# Patient Record
Sex: MALE | Race: WHITE | Employment: FULL TIME | URBAN - METROPOLITAN AREA
[De-identification: names, ages, dates, MRNs, and addresses within clinical notes are randomized per-mention and may not be internally consistent; named-entity substitution may affect disease eponyms.]

---

## 2018-02-21 ENCOUNTER — OFFICE VISIT (OUTPATIENT)
Dept: UROLOGY | Age: 63
End: 2018-02-21

## 2018-02-21 VITALS
WEIGHT: 215 LBS | HEIGHT: 70 IN | SYSTOLIC BLOOD PRESSURE: 114 MMHG | DIASTOLIC BLOOD PRESSURE: 81 MMHG | BODY MASS INDEX: 30.78 KG/M2 | TEMPERATURE: 97.7 F | OXYGEN SATURATION: 96 % | HEART RATE: 82 BPM

## 2018-02-21 DIAGNOSIS — R97.20 ELEVATED PSA: Primary | ICD-10-CM

## 2018-02-21 LAB
BILIRUB UR QL STRIP: NEGATIVE
GLUCOSE UR-MCNC: NORMAL MG/DL
KETONES P FAST UR STRIP-MCNC: NEGATIVE MG/DL
PH UR STRIP: 5 [PH] (ref 4.6–8)
PROT UR QL STRIP: NEGATIVE
SP GR UR STRIP: 1.01 (ref 1–1.03)
UA UROBILINOGEN AMB POC: NORMAL (ref 0.2–1)
URINALYSIS CLARITY POC: CLEAR
URINALYSIS COLOR POC: YELLOW
URINE BLOOD POC: NORMAL
URINE LEUKOCYTES POC: NEGATIVE
URINE NITRITES POC: NEGATIVE

## 2018-02-21 RX ORDER — SULFAMETHOXAZOLE AND TRIMETHOPRIM 800; 160 MG/1; MG/1
TABLET ORAL
Qty: 3 TAB | Refills: 0 | Status: SHIPPED | OUTPATIENT
Start: 2018-02-21 | End: 2018-04-05

## 2018-02-21 RX ORDER — CIPROFLOXACIN 500 MG/1
TABLET ORAL
Qty: 3 TAB | Refills: 0 | Status: SHIPPED | OUTPATIENT
Start: 2018-02-21 | End: 2018-04-05

## 2018-02-21 RX ORDER — METFORMIN HYDROCHLORIDE 500 MG/1
TABLET ORAL 2 TIMES DAILY WITH MEALS
COMMUNITY
Start: 2018-02-01

## 2018-02-21 RX ORDER — LISINOPRIL 2.5 MG/1
2.5 TABLET ORAL DAILY
COMMUNITY
Start: 2018-02-01

## 2018-02-21 NOTE — PROGRESS NOTES
MrMonica Hannah has a reminder for a \"due or due soon\" health maintenance. I have asked that he contact his primary care provider for follow-up on this health maintenance.

## 2018-02-21 NOTE — MR AVS SNAPSHOT
[address] [facility] [place] [address] [address] 
[phone] Patient: Raymond Queen MRN: [id number] Visit Information Date & Time Provider Department Dept. Phone Encounter #  
 2/21/2018  2:00 PM Krysten Lariosand [facility] [phone] Your Appointments 3/27/2018 10:15 AM  
BIOPSY with Elen Winchester MD  
[facility] Appt Note: prostate bx  
 [address] [place] [address] [address]  
[phone] [address] [facility] [address] Upcoming Health Maintenance Date Due Hepatitis C Screening 1955 DTaP/Tdap/Td series (1 - Tdap) 10/4/1975 FOBT Q 1 YEAR AGE 50-75 10/4/2004 ZOSTER VACCINE AGE 60> 8/4/2014 Influenza Age 5 to Adult 8/1/2017 Allergies as of 2/21/2018  Review Complete On: 2/21/2018 By: Rosy Peralta LPN No Known Allergies Current Immunizations  Never Reviewed No immunizations on file. Not reviewed this visit You Were Diagnosed With   
  
 Codes Comments Elevated PSA    -  Primary ICD-10-CM: R97.20 ICD-9-CM: 790.93 Vitals BP Pulse Temp Height(growth percentile) Weight(growth percentile) SpO2  
 114/81 (BP 1 Location: Left arm, BP Patient Position: Sitting) 82 97.7 °F (36.5 °C) 5' 10\" (1.778 m) 215 lb (97.5 kg) 96% BMI Smoking Status 30.85 kg/m2 Former Smoker BMI and BSA Data Body Mass Index Body Surface Area  
 30.85 kg/m 2 2.19 m 2 Preferred Pharmacy Pharmacy Name Phone [facility], [address] [place] [address] [phone] Your Updated Medication List  
  
   
This list is accurate as of 2/21/18  3:26 PM.  Always use your most recent med list.  
  
  
  
  
 lisinopril 2.5 mg tablet Commonly known as:  Adeola Luis  
  
 metFORMIN 500 mg tablet Commonly known as:  GLUCOPHAGE We Performed the Following AMB POC URINALYSIS DIP STICK AUTO W/O MICRO [12425 CPT(R)] Patient Instructions Prostate Cancer Screening: Care Instructions Your Care Instructions The prostate gland is an organ found just below a man's bladder. It is the size and shape of a walnut. It surrounds the tube that carries urine from the bladder out of the body through the penis. This tube is called the urethra. Prostate cancer is the abnormal growth of cells in the prostate. It is the second most common type of cancer in men. (Skin cancer is the most common.) Most cases of prostate cancer occur in men older than 72. The disease runs in families. And it's more common in -American men. When it's found and treated early, prostate cancer may be cured. But it is not always treated. This is because prostate cancer may not shorten your life, especially if you are older and the cancer is growing slowly. Follow-up care is a key part of your treatment and safety. Be sure to make and go to all appointments, and call your doctor if you are having problems. It's also a good idea to know your test results and keep a list of the medicines you take. What are the screening tests for prostate cancer? The main screening test for prostate cancer is the prostate-specific antigen (PSA) test. This is a blood test that measures how much PSA is in your blood. A high level may mean that you have an enlargement, an infection, or cancer. Along with the PSA test, you may have a digital rectal exam. The digital (finger) rectal exam checks for anything abnormal in your prostate. To do the exam, the doctor puts a lubricated, gloved finger into your rectum. If these tests suggest cancer, you may need a prostate biopsy. How is prostate cancer diagnosed?  
In a biopsy, the doctor takes small tissue samples from your prostate gland. Another doctor then looks at the tissue under a microscope to see if there are cancer cells, signs of infection, or other problems. The results help diagnose prostate cancer. What are the pros and cons of screening? Neither a PSA test nor a digital rectal exam can tell you for sure that you do or do not have cancer. But they can help you decide if you need more tests, such as a prostate biopsy. Screening tests may be useful because most men with prostate cancer don't have symptoms. It can be hard to know if you have cancer until it is more advanced. And then it's harder to treat. But having a PSA test can also cause harm. The test may show high levels of PSA that aren't caused by cancer. So you could have a prostate biopsy you didn't need. Or the PSA test might be normal when there is cancer, so a cancer might not be found early. The test can also find cancers that would never have caused a problem during your lifetime. So you might have treatment that was not needed. Prostate cancer usually develops late in life and grows slowly. For many men, it does not shorten their lives. Some experts advise screening only for men who are at high risk. Talk with your doctor to see if screening is right for you. Where can you learn more? Go to http://twila-jenny.info/. Enter R550 in the search box to learn more about \"Prostate Cancer Screening: Care Instructions. \" Current as of: May 12, 2017 Content Version: 11.4 © 9146-5904 [facility]. Care instructions adapted under license by [employer] (which disclaims liability or warranty for this information). If you have questions about a medical condition or this instruction, always ask your healthcare professional. [facility] [address] any warranty or liability for your use of this information. Introducing [facility]!    
 [employer] introduces [employer] patient portal. Now you can access parts of your medical record, email your doctor's office, and request medication refills online. 1. In your internet browser, go to [email or web address] 2. Click on the First Time User? Click Here link in the Sign In box. You will see the New Member Sign Up page. 3. Enter your LinguaNext Access Code exactly as it appears below. You will not need to use this code after youve completed the sign-up process. If you do not sign up before the expiration date, you must request a new code. · LinguaNext Access Code: [email or web address] Expires: 5/22/2018  1:29 PM 
 
4. Enter the last four digits of your Social Security Number (xxxx) and Date of Birth (mm/dd/yyyy) as indicated and click Submit. You will be taken to the next sign-up page. 5. Create a LinguaNext ID. This will be your LinguaNext login ID and cannot be changed, so think of one that is secure and easy to remember. 6. Create a LinguaNext password. You can change your password at any time. 7. Enter your Password Reset Question and Answer. This can be used at a later time if you forget your password. 8. Enter your e-mail address. You will receive e-mail notification when new information is available in [address] 9. Click Sign Up. You can now view and download portions of your medical record. 10. Click the Download Summary menu link to download a portable copy of your medical information. If you have questions, please visit the Frequently Asked Questions section of the LinguaNext website. Remember, LinguaNext is NOT to be used for urgent needs. For medical emergencies, dial 911. Now available from your iPhone and Android! Please provide this summary of care documentation to your next provider. Your primary care clinician is listed as Kolby Shirley. If you have any questions after today's visit, please call [phone].

## 2018-02-21 NOTE — PROGRESS NOTES
[address]    Chief Complaint   Patient presents with    New Patient    Elevated PSA    Nocturia       History and Physical    Patient is a pleasant 79-year-old male  who is sent for a one-time elevated PSA. The patient is uncertain about whether he has had PSA determinations done in the past.  This determination was done at a time when the patient was not having significant issues. The patient does, however, report that he has had trouble with urinary frequency but was found to have hyperglycemia and was begun on metformin. This has significantly improved his urination frequency at night but he does continue to have some daytime voiding frequency. There is no dysuria, no incontinence, no restriction of the stream.  The patient has not seen any hematuria. The patient denies any  injury surgery or instrumentation. There is no family history of benign or malignant prostate issues    History reviewed. No pertinent past medical history. There is no problem list on file for this patient. History reviewed. No pertinent surgical history. Current Outpatient Prescriptions   Medication Sig Dispense Refill    lisinopril (PRINIVIL, ZESTRIL) 2.5 mg tablet       metFORMIN (GLUCOPHAGE) 500 mg tablet        No Known Allergies  Social History     Social History    Marital status:      Spouse name: N/A    Number of children: N/A    Years of education: N/A     Occupational History    Not on file.      Social History Main Topics    Smoking status: Former Smoker     Quit date: 1/1/1981    Smokeless tobacco: Never Used    Alcohol use Yes    Drug use: No    Sexual activity: Yes     Other Topics Concern    Not on file     Social History Narrative    No narrative on file      Family History   Problem Relation Age of Onset    Cancer Mother     Breast Cancer Mother     Diabetes Brother     Diabetes Maternal Grandfather     Diabetes Paternal Grandfather              Visit Vitals    BP 114/81 (BP 1 Location: Left arm, BP Patient Position: Sitting)    Pulse 82    Temp 97.7 °F (36.5 °C)    Ht 5' 10\" (1.778 m)    Wt 215 lb (97.5 kg)    SpO2 96%    BMI 30.85 kg/m2     Physical        Gen: WDWN adult NAD  Head  : normocephalic,  Normal ROM; eyes without normal pupils, EOMs, no masses;  conjunctiva normal  Neck: normal movement,  no evident mass,  No evident adenopathy, trachea midline,    Abd :bowel sounds normal, no masses, tenderness, organomegaly  Flanks     -prostate is 40 g with a normal right lobe. The left lobe discloses a firm or hard area within the center occupying about 2 cm and midway between the base and the apex. It extends minimally to the lateral sulcus    Extremities- no edema, arthritis, deformity, swelling  Psych- oriented, no evident anxiety, no cognitive impairment evident    Urine is trace positive for blood but on microscopic I see less than 3 red blood cells                Impression/ PLAN  Borderline PSA elevation at 4.01  Irregular prostate exam    Plan:  Extensive discussion about prostate anatomy and potential for cancer development. I talked with him about the alternatives and advised that we proceed with transrectal ultrasound and proceed on with biopsy if necessary. The patient is aware of the risks that include, but are not limited to, infection, sepsis, bleeding, finding of cancer, failure to diagnose, and the possible need for additional procedures. This visit exceeded 30 minutes and >50% was counselling  The patient understands the discussion and plan    PLEASE NOTE:      This document has been produced using voice recognition software.   Unrecognized errors in transcription may be present    Elvi Corrales MD

## 2018-02-21 NOTE — PATIENT INSTRUCTIONS
Prostate Cancer Screening: Care Instructions  Your Care Instructions    The prostate gland is an organ found just below a man's bladder. It is the size and shape of a walnut. It surrounds the tube that carries urine from the bladder out of the body through the penis. This tube is called the urethra. Prostate cancer is the abnormal growth of cells in the prostate. It is the second most common type of cancer in men. (Skin cancer is the most common.)  Most cases of prostate cancer occur in men older than 72. The disease runs in families. And it's more common in -American men. When it's found and treated early, prostate cancer may be cured. But it is not always treated. This is because prostate cancer may not shorten your life, especially if you are older and the cancer is growing slowly. Follow-up care is a key part of your treatment and safety. Be sure to make and go to all appointments, and call your doctor if you are having problems. It's also a good idea to know your test results and keep a list of the medicines you take. What are the screening tests for prostate cancer? The main screening test for prostate cancer is the prostate-specific antigen (PSA) test. This is a blood test that measures how much PSA is in your blood. A high level may mean that you have an enlargement, an infection, or cancer. Along with the PSA test, you may have a digital rectal exam. The digital (finger) rectal exam checks for anything abnormal in your prostate. To do the exam, the doctor puts a lubricated, gloved finger into your rectum. If these tests suggest cancer, you may need a prostate biopsy. How is prostate cancer diagnosed? In a biopsy, the doctor takes small tissue samples from your prostate gland. Another doctor then looks at the tissue under a microscope to see if there are cancer cells, signs of infection, or other problems. The results help diagnose prostate cancer.   What are the pros and cons of screening? Neither a PSA test nor a digital rectal exam can tell you for sure that you do or do not have cancer. But they can help you decide if you need more tests, such as a prostate biopsy. Screening tests may be useful because most men with prostate cancer don't have symptoms. It can be hard to know if you have cancer until it is more advanced. And then it's harder to treat. But having a PSA test can also cause harm. The test may show high levels of PSA that aren't caused by cancer. So you could have a prostate biopsy you didn't need. Or the PSA test might be normal when there is cancer, so a cancer might not be found early. The test can also find cancers that would never have caused a problem during your lifetime. So you might have treatment that was not needed. Prostate cancer usually develops late in life and grows slowly. For many men, it does not shorten their lives. Some experts advise screening only for men who are at high risk. Talk with your doctor to see if screening is right for you. Where can you learn more? Go to http://twila-jenny.info/. Enter R550 in the search box to learn more about \"Prostate Cancer Screening: Care Instructions. \"  Current as of: May 12, 2017  Content Version: 11.4  © 1327-9061 [employer], [employer]. Care instructions adapted under license by [employer] (which disclaims liability or warranty for this information). If you have questions about a medical condition or this instruction, always ask your healthcare professional. [facility] any warranty or liability for your use of this information.

## 2018-03-27 ENCOUNTER — HOSPITAL ENCOUNTER (OUTPATIENT)
Dept: LAB | Age: 63
Discharge: HOME | End: 2018-03-27
Payer: COMMERCIAL

## 2018-03-27 ENCOUNTER — OFFICE VISIT (OUTPATIENT)
Dept: UROLOGY | Age: 63
End: 2018-03-27

## 2018-03-27 VITALS
TEMPERATURE: 98.1 F | WEIGHT: 215 LBS | SYSTOLIC BLOOD PRESSURE: 134 MMHG | HEART RATE: 62 BPM | BODY MASS INDEX: 30.78 KG/M2 | HEIGHT: 70 IN | OXYGEN SATURATION: 97 % | DIASTOLIC BLOOD PRESSURE: 87 MMHG

## 2018-03-27 DIAGNOSIS — R97.20 ELEVATED PSA: Primary | ICD-10-CM

## 2018-03-27 LAB
BILIRUB UR QL STRIP: NEGATIVE
GLUCOSE UR-MCNC: NEGATIVE MG/DL
KETONES P FAST UR STRIP-MCNC: NEGATIVE MG/DL
PH UR STRIP: 5.5 [PH] (ref 4.6–8)
PROT UR QL STRIP: NEGATIVE
SP GR UR STRIP: 1.02 (ref 1–1.03)
UA UROBILINOGEN AMB POC: NORMAL (ref 0.2–1)
URINALYSIS CLARITY POC: CLEAR
URINALYSIS COLOR POC: YELLOW
URINE BLOOD POC: NEGATIVE
URINE LEUKOCYTES POC: NEGATIVE
URINE NITRITES POC: NEGATIVE

## 2018-03-27 PROCEDURE — G0416 PROSTATE BIOPSY, ANY MTHD: HCPCS | Performed by: UROLOGY

## 2018-03-27 NOTE — PATIENT INSTRUCTIONS
Prostate Biopsy and Ultrasound: About This Test  What is it? A prostate biopsy is a type of test. Your doctor takes small tissue samples from your prostate gland. Then another doctor looks at the tissue under a microscope to see if there are cancer cells. This test is done by a doctor who specializes in men's genital and urinary problems (urologist). It can be done in your doctor's office, a day surgery clinic, or a hospital operating room. To get the tissue samples from the prostate, the doctor inserts a thin needle through the rectum, the urethra, or the area between the anus and scrotum (perineum). The most common method is through the rectum. Your doctor may use ultrasound to help guide the needle. Why is this test done? You may need a prostate biopsy if your doctor found something of concern during a digital rectal exam. Or you may need it if a blood test showed a high level of prostate-specific antigen (PSA). A biopsy can help find out if you have prostate cancer. How can you prepare for this test?  Before you have a prostate biopsy, tell your doctor if:  · You are taking any medicines or using any herbal supplements. · You are allergic to any medicines. · You have had bleeding problems, or you take aspirin or some other blood thinner. What happens before the test?  · You may need to have an enema before the test.  · You will need to take off all or most of your clothes. You will be given a cloth or paper gown to use during the test.  · You may be given a sedative through a vein (IV) in your arm. The sedative will help you relax and stay still. What happens during the test?  Through the rectum  · You may be asked to kneel, lie on your side, or lie on your back. · Your doctor may inject an anesthetic around the prostate to numb the area before the sample is taken. · Ultrasound is often used to guide the needle to the correct spot. · Your doctor may choose to use a needle guide for the biopsy. He or she will attach the guide to a finger. Your doctor will insert the finger into your rectum. · The needle will enter the prostate and take 6 to 12 samples. Through the urethra  · You will lie on your back. Your feet will rest in stirrups. · You will get anesthesia. The anesthesia may make you sleep. Or it may just numb the area being worked on.  · Your doctor will insert a lighted scope (cystoscope) into your urethra. The scope allows your doctor to look directly at the prostate. Your doctor will pass a cutting loop through the scope to remove samples of prostate tissue. Through the perineum  · You will lie on an exam table either on your side or on your back with your knees bent. You will get anesthesia. The anesthesia may make you sleep. Or it may just numb the area being worked on.  · Your doctor will make a small cut in your perineum. Then he or she will insert a finger into the rectum to hold the prostate. · Your doctor will then insert the needle through the cut and into the prostate. · The needle collects samples of tissue and is then pulled out. What else should you know about this test?  · A prostate biopsy has a slight risk of causing problems such as infection or bleeding. · If the biopsy went through your rectum, you may have a small amount of bleeding from your rectum for 2 to 3 days after the biopsy. · You may have a little pain in your pelvic area. You may also have a little blood in your urine for 1 to 5 days. · You may have some blood in your semen for a week or longer. How long will the test take? · This test will take about 15 to 45 minutes. What happens after the test?  Your doctor will tell you what to expect and watch for after your test. In general:  · If you have anesthesia that makes you sleep, you will be in a recovery room for a few hours. You will need someone to drive you home. You may feel tired for the rest of the day.   · You will probably be able to go home right away.  · If your doctor had you stop taking any medicine for the biopsy, ask him or her when you can start taking it again. If you were given antibiotics, take them as directed. · Do not do heavy work or exercise for 4 hours after the test.  · Your doctor will tell you how long it may take to get your results back. Follow-up care is a key part of your treatment and safety. Be sure to make and go to all appointments, and call your doctor if you are having problems. It's also a good idea to keep a list of the medicines you take. Ask your doctor when you can expect to have your test results. Where can you learn more? Go to http://twila-jenny.info/. Enter Y504 in the search box to learn more about \"Prostate Biopsy and Ultrasound: About This Test.\"  Current as of: May 12, 2017  Content Version: 11.4  © 4219-5457 Healthwise, [employer]. Care instructions adapted under license by [employer] (which disclaims liability or warranty for this information). If you have questions about a medical condition or this instruction, always ask your healthcare professional. [address] any warranty or liability for your use of this information.

## 2018-03-27 NOTE — MR AVS SNAPSHOT
[address] [place], [place] [address] [place] [address] 
[phone] Patient: Yogesh Joy MRN: [id number] Visit Information Date & Time Provider Department Dept. Phone Encounter #  
 3/27/2018 10:15 AM Risa Daniels[address] [facility] [phone] [id number] Upcoming Health Maintenance Date Due Hepatitis C Screening 1955 DTaP/Tdap/Td series (1 - Tdap) 10/4/1975 FOBT Q 1 YEAR AGE 50-75 10/4/2004 ZOSTER VACCINE AGE 60> 8/4/2014 Influenza Age 5 to Adult 8/1/2017 Allergies as of 3/27/2018  Review Complete On: 3/27/2018 By: Margarita Hernandez LPN No Known Allergies Current Immunizations  Never Reviewed No immunizations on file. Not reviewed this visit You Were Diagnosed With   
  
 Codes Comments Elevated PSA    -  Primary ICD-10-CM: R97.20 ICD-9-CM: 790.93 Vitals BP Pulse Temp Height(growth percentile) Weight(growth percentile) SpO2  
 134/87 (BP 1 Location: Left arm, BP Patient Position: Sitting) 62 98.1 °F (36.7 °C) 5' 10\" (1.778 m) 215 lb (97.5 kg) 97% BMI Smoking Status 30.85 kg/m2 Former Smoker Vitals History BMI and BSA Data Body Mass Index Body Surface Area  
 30.85 kg/m 2 2.19 m 2 [facility] [address] [address] [phone] Your Updated Medication List  
  
   
This list is accurate as of 3/27/18 10:40 AM.  Always use your most recent med list.  
  
  
  
  
 ciprofloxacin HCl 500 mg tablet Commonly known as:  CIPRO One tab po evening before biopsy; one tab po morning of biopsy; one tab po evening of biopsy  
  
 lisinopril 2.5 mg tablet Commonly known as:  PRINIVIL, ZESTRIL  
  
 metFORMIN 500 mg tablet Commonly known as:  GLUCOPHAGE  
  
 trimethoprim-sulfamethoxazole 160-800 mg per tablet Commonly known as:  BACTRIM DS, SEPTRA DS One tab po evening before biopsy; one tab po morning of biopsy; one tab po evening of biopsy We Performed the Following AMB POC URINALYSIS DIP STICK AUTO W/O MICRO [74134 CPT(R)] Patient Instructions Prostate Biopsy and Ultrasound: About This Test 
What is it? A prostate biopsy is a type of test. Your doctor takes small tissue samples from your prostate gland. Then another doctor looks at the tissue under a microscope to see if there are cancer cells. This test is done by a doctor who specializes in men's genital and urinary problems (urologist). It can be done in your doctor's office, a day surgery clinic, or a hospital operating room. To get the tissue samples from the prostate, the doctor inserts a thin needle through the rectum, the urethra, or the area between the anus and scrotum (perineum). The most common method is through the rectum. Your doctor may use ultrasound to help guide the needle. Why is this test done? You may need a prostate biopsy if your doctor found something of concern during a digital rectal exam. Or you may need it if a blood test showed a high level of prostate-specific antigen (PSA). A biopsy can help find out if you have prostate cancer. How can you prepare for this test? 
Before you have a prostate biopsy, tell your doctor if: 
· You are taking any medicines or using any herbal supplements. · You are allergic to any medicines. · You have had bleeding problems, or you take aspirin or some other blood thinner. What happens before the test? 
· You may need to have an enema before the test. 
· You will need to take off all or most of your clothes. You will be given a cloth or paper gown to use during the test. 
· You may be given a sedative through a vein (IV) in your arm. The sedative will help you relax and stay still.  
What happens during the test? 
 Through the rectum · You may be asked to kneel, lie on your side, or lie on your back. · Your doctor may inject an anesthetic around the prostate to numb the area before the sample is taken. · Ultrasound is often used to guide the needle to the correct spot. · Your doctor may choose to use a needle guide for the biopsy. He or she will attach the guide to a finger. Your doctor will insert the finger into your rectum. · The needle will enter the prostate and take 6 to 12 samples. Through the urethra · You will lie on your back. Your feet will rest in stirrups. · You will get anesthesia. The anesthesia may make you sleep. Or it may just numb the area being worked on. 
· Your doctor will insert a lighted scope (cystoscope) into your urethra. The scope allows your doctor to look directly at the prostate. Your doctor will pass a cutting loop through the scope to remove samples of prostate tissue. Through the perineum · You will lie on an exam table either on your side or on your back with your knees bent. You will get anesthesia. The anesthesia may make you sleep. Or it may just numb the area being worked on. 
· Your doctor will make a small cut in your perineum. Then he or she will insert a finger into the rectum to hold the prostate. · Your doctor will then insert the needle through the cut and into the prostate. · The needle collects samples of tissue and is then pulled out. What else should you know about this test? 
· A prostate biopsy has a slight risk of causing problems such as infection or bleeding. · If the biopsy went through your rectum, you may have a small amount of bleeding from your rectum for 2 to 3 days after the biopsy. · You may have a little pain in your pelvic area. You may also have a little blood in your urine for 1 to 5 days. · You may have some blood in your semen for a week or longer. How long will the test take? · This test will take about 15 to 45 minutes. What happens after the test? 
Your doctor will tell you what to expect and watch for after your test. In general: · If you have anesthesia that makes you sleep, you will be in a recovery room for a few hours. You will need someone to drive you home. You may feel tired for the rest of the day. · You will probably be able to go home right away. · If your doctor had you stop taking any medicine for the biopsy, ask him or her when you can start taking it again. If you were given antibiotics, take them as directed. · Do not do heavy work or exercise for 4 hours after the test. 
· Your doctor will tell you how long it may take to get your results back. Follow-up care is a key part of your treatment and safety. Be sure to make and go to all appointments, and call your doctor if you are having problems. It's also a good idea to keep a list of the medicines you take. Ask your doctor when you can expect to have your test results. Where can you learn more? Go to http://twila-jenny.info/. Enter Y504 in the search box to learn more about \"Prostate Biopsy and Ultrasound: About This Test.\" Current as of: May 12, 2017 Content Version: 11.4 © 1778-9442 Healthwise, mGaadi. Care instructions adapted under license by [employer] (which disclaims liability or warranty for this information). If you have questions about a medical condition or this instruction, always ask your healthcare professional. [place] [address] any warranty or liability for your use of this information. Introducing [facility]! Lolly Perkins introduces StaffInsight patient portal. Now you can access parts of your medical record, email your doctor's office, and request medication refills online. 1. In your internet browser, go to [email or web address] 2. Click on the First Time User? Click Here link in the Sign In box. You will see the New Member Sign Up page. 3. Enter your [email or web address].fm Access Code exactly as it appears below. You will not need to use this code after youve completed the sign-up process. If you do not sign up before the expiration date, you must request a new code. · [email or web address].fm Access Code: [email or web address] Expires: 5/22/2018  2:29 PM 
 
4. Enter the last four digits of your Social Security Number (xxxx) and Date of Birth (mm/dd/yyyy) as indicated and click Submit. You will be taken to the next sign-up page. 5. Create a OKDJ.fm ID. This will be your OKDJ.fm login ID and cannot be changed, so think of one that is secure and easy to remember. 6. Create a OKDJ.fm password. You can change your password at any time. 7. Enter your Password Reset Question and Answer. This can be used at a later time if you forget your password. 8. Enter your e-mail address. You will receive e-mail notification when new information is available in [address]. 9. Click Sign Up. You can now view and download portions of your medical record. 10. Click the Download Summary menu link to download a portable copy of your medical information. If you have questions, please visit the Frequently Asked Questions section of the OKDJ.fm website. Remember, OKDJ.fm is NOT to be used for urgent needs. For medical emergencies, dial 911. Now available from your iPhone and Android! Please provide this summary of care documentation to your next provider. Your primary care clinician is listed as Ashley Hansen. If you have any questions after today's visit, please call [phone].

## 2018-03-27 NOTE — PROGRESS NOTES
RBV Per Dr. Marcial Hyatt order placed for prostate biopsy performed today in office by Dr. Marcial Hyatt.

## 2018-03-27 NOTE — PROGRESS NOTES
INDICATIONS FOR STUDY: Elevated PSA    PATIENT IDENTIFIED PRIOR TO PROCEDURE    PSA: 4.1    TRANSRECTAL ULTRASOUND IMAGING OF PROSTATE        The patient is placed in the left lateral decubitus position and draped appropriately. A transrectal ultrasound guided is placed in the patient's rectum with multiple transverse and longitudinal images of the prostate seminal vesicles and bladder obtained. Prostate dimensions:  Height 4.79 Length 4.06 Width 3.0    Prostate volume: 35 g  Prostate appearance: There is an area of hypoechogenicity in the left lateral aspect as mentioned on digital rectal exam.  There is dense medial transition zone calcification equal on both sides causing acoustic shadowing    Biopsy procedure:   Prior to the biopsy procedure, the patient has been counseled about the risks which include, but are not limited to, infection; bleeding in urine, bowel movement,  Or ejaculation; failure to diagnose a cancer that is present; septic infection despite antibiotics; possible need for additional procedures, including biopsy, in the future. The patient is aware that a diagnosis of prostate cancer might be made at a later time. A spinal needle is introduced through the transrectal guide and quarter percent plain Marcaine is used to obtain a prostate block along the Denonvillier's  fascia and between seminal vesicles and prostate. After a suitable interval is permitted for establishment of anesthesia, a transurethral prostate biopsy needle is introduced and 6 cores of prostate tissue are obtained from the left side of the prostate and 6 cores of prostate tissue obtained from the right side of the prostate. The patient is then returned to the supine position and observed for a suitable period of time. The patient receives post biopsy instructions regarding activity, medication, and expectation of bleeding in his urine, bowel movement, and ejaculate.  The procedure was terminated, the patient having tolerated the procedure well.

## 2018-03-27 NOTE — PROGRESS NOTES
Mr. Kenisha Koehler has a reminder for a \"due or due soon\" health maintenance. I have asked that he contact his primary care provider for follow-up on this health maintenance. [facility]  OFFICE PROCEDURE PROGRESS NOTE        Chart reviewed for the following:   Jack JETER LPN, have reviewed the History, Physical and updated the Allergic reactions for [address] performed immediately prior to start of procedure:   Jack JETER LPN, have performed the following reviews on Jevon Michele prior to the start of the procedure:            * Patient was identified by name and date of birth   * Agreement on procedure being performed was verified  * Risks and Benefits explained to the patient  * Procedure site verified and marked as necessary  * Patient was positioned for comfort  * Consent was signed and verified     Time: 10:30am      Date of procedure: 3/27/2018    Procedure performed by:  Puma Snow MD    Provider assisted by: Candi Bean LPN    Patient assisted by: wife    How tolerated by patient: tolerated the procedure well with no complications    Post Procedural Pain Scale: 0 - No Hurt    Comments: Patient verbalized understanding of procedure and post procedure instructions. RBV Per Dr. Mukund Elam patient to have prostate tissue which was biopsied in office today sent out for pathology.

## 2018-04-05 ENCOUNTER — OFFICE VISIT (OUTPATIENT)
Dept: UROLOGY | Age: 63
End: 2018-04-05

## 2018-04-05 VITALS
HEIGHT: 70 IN | SYSTOLIC BLOOD PRESSURE: 114 MMHG | WEIGHT: 215 LBS | OXYGEN SATURATION: 94 % | BODY MASS INDEX: 30.78 KG/M2 | HEART RATE: 66 BPM | DIASTOLIC BLOOD PRESSURE: 74 MMHG

## 2018-04-05 DIAGNOSIS — C61 CANCER OF PROSTATE W/MED RECUR RISK (T2B-C OR GLEASON 7 OR PSA 10-20) (HCC): Primary | ICD-10-CM

## 2018-04-05 NOTE — PROGRESS NOTES
Chief Complaint   Patient presents with    Biopsy Results    Elevated PSA       HISTORY OF PRESENT ILLNESS:  Mitchell Singleton is a 60 y.o. male who presents today in follow-up to the examination and subsequent prostate biopsy by Dr. Irma Shirley a week ago. In summary, he presented here with a PSA of 4.01 and on Dr. Dahiana Villafana examination was noted to have an indurated left lobe of the prostate. The pathology report was returned as showing Kersey grade 6 and 7 in the entire left lobe. No cancer was mentioned in the right lobe. ROS documented on the chart    History reviewed. No pertinent past medical history. History reviewed. No pertinent surgical history. Social History   Substance Use Topics    Smoking status: Former Smoker     Quit date: 1/1/1981    Smokeless tobacco: Never Used    Alcohol use Yes       No Known Allergies    Family History   Problem Relation Age of Onset    Cancer Mother     Breast Cancer Mother     Diabetes Brother     Diabetes Maternal Grandfather     Diabetes Paternal Grandfather        Current Outpatient Prescriptions   Medication Sig Dispense Refill    metFORMIN (GLUCOPHAGE) 500 mg tablet       lisinopril (PRINIVIL, ZESTRIL) 2.5 mg tablet            PHYSICAL EXAMINATION:   Visit Vitals    /74 (BP 1 Location: Left arm, BP Patient Position: Sitting)    Pulse 66    Ht 5' 10\" (1.778 m)    Wt 215 lb (97.5 kg)    SpO2 94%    BMI 30.85 kg/m2     Constitutional: WDWN, Pleasant and appropriate affect, No acute distress. CV:  No peripheral swelling noted  Respiratory: No respiratory distress or difficulties  Abdomen:  No abdominal masses or tenderness. No CVA tenderness. No inguinal hernias noted.  Male:    Deferred today, refer to Dr. Dahiana Villafana note of a week ago. Skin: No jaundice. Neuro/Psych:  Alert and oriented x 3, affect appropriate. Lymphatic:   No enlarged inguinal lymph nodes.          Results for orders placed or performed in visit on 03/27/18   AMB POC URINALYSIS DIP STICK AUTO W/O MICRO   Result Value Ref Range    Color (UA POC) Yellow     Clarity (UA POC) Clear     Glucose (UA POC) Negative Negative    Bilirubin (UA POC) Negative Negative    Ketones (UA POC) Negative Negative    Specific gravity (UA POC) 1.025 1.001 - 1.035    Blood (UA POC) Negative Negative    pH (UA POC) 5.5 4.6 - 8.0    Protein (UA POC) Negative Negative    Urobilinogen (UA POC) 0.2 mg/dL 0.2 - 1    Nitrites (UA POC) Negative Negative    Leukocyte esterase (UA POC) Negative Negative         REVIEW OF LABS AND IMAGING:     Imaging Report Reviewed? NO     Images Reviewed? NO           Other Lab Data Reviewed? YES         ASSESSMENT:     ICD-10-CM ICD-9-CM    1. Cancer of prostate w/med recur risk (T2b-c or [place] 7 or PSA 10-20) ([facility] C61 [id number]             PLAN / DISCUSSION: : I discussed with him the findings of the biopsy report and he and I went over the findings the diagram the websites pertinent to prostate cancer and its staging and treatment options. (NCCN guidelines as well as Medscape staging guidelines). In  a 79-year-old otherwise healthy man with localized prostate cancer and a PSA of well less than 10, my first recommendation would be radical prostatectomy and my second choice would be radiation therapy. I have given him the websites literature and I would like for him to discuss this with his wife and family and then return in another week or 2 to talk with Dr. Irma Shirley about his feelings. The patient expresses understanding and agreement of the discussion and plan. Rodrigo Skaggs MD on 4/5/2018         Please note: This document has been produced using voice recognition software. Unrecognized errors in transcription may be present.

## 2018-04-05 NOTE — PATIENT INSTRUCTIONS
Prostate Cancer: Care Instructions  Your Care Instructions    The prostate gland is a small, walnut-shaped organ that lies just below a man's bladder. It surrounds the urethra, the tube that carries urine from the bladder out of the body through the penis. Prostate cancer is the abnormal growth of cells in the prostate gland. Prostate cancer cells can spread within the prostate, to nearby lymph nodes and other tissues, and to other parts of the body. When the cancer hasn't spread outside the prostate, it is called localized prostate cancer. With localized prostate cancer, your options depend on how likely it is that your cancer will grow. Tests will show if your cancer is likely to grow. · Low-risk cancer isn't likely to grow right away. If your cancer is low-risk, you can choose active surveillance. This means your cancer will be watched closely by your doctor with regular checkups and tests to see if the cancer grows. This choice allows you to delay having surgery or radiation, often for many years. If the cancer grows very slowly, you may never need treatment. · Medium-risk cancer is more likely to grow. Some men with this type of cancer may be able to choose active surveillance. Others may need to choose surgery or radiation. · High-risk cancer is most likely to grow. If you have high-risk cancer, you will likely need to choose surgery or radiation. If your cancer has already spread outside the prostate or to other parts of the body, then you may have other treatments, like chemotherapy or hormone therapy. If you are over age [de-identified] or have other serious health problems, like heart disease, you may choose not to have treatments to cure your cancer. Instead, you can just have treatments to manage your symptoms. This is called watchful waiting. Finding out that you have cancer is scary. You may feel many emotions and may need some help coping. Seek out family, friends, and counselors for support.  You also can do things at home to make yourself feel better while you go through treatment. Call the [facility] ([phone]) or visit its website at [address] [place] for more information. Follow-up care is a key part of your treatment and safety. Be sure to make and go to all appointments, and call your doctor if you are having problems. It's also a good idea to know your test results and keep a list of the medicines you take. How can you care for yourself at home? · Your doctor will talk to you about your treatment options. You may need to learn more about each of them before you can decide which treatment is best for you. · Take your medicines exactly as prescribed. Call your doctor if you think you are having a problem with your medicine. You will get more details on the specific medicines your doctor prescribes. · Eat healthy food. If you do not feel like eating, try to eat food that has protein and extra calories to keep up your strength and prevent weight loss. Drink liquid meal replacements for extra calories and protein. Try to eat your main meal early. · Take steps to control your stress and workload. Learn relaxation techniques. ¨ Share your feelings. Stress and tension affect our emotions. By expressing your feelings to others, you may be able to understand and cope with them. ¨ Consider joining a support group. Talking about a problem with your spouse, a good friend, or other people with similar problems is a good way to reduce tension and stress. ¨ Express yourself through art. Try writing, crafts, dance, or art to relieve stress. Some dance, writing, or art groups may be available just for people who have cancer. ¨ Be kind to your body and mind. Getting enough sleep, eating a healthy diet, and taking time to do things you enjoy can contribute to an overall feeling of balance in your life and can help reduce stress. ¨ Get help if you need it.  Discuss your concerns with your doctor or counselor. · Get some physical activity every day, but do not get too tired. Keep doing the hobbies you enjoy as your energy allows. · If you are vomiting or have diarrhea:  ¨ Drink plenty of fluids (enough so that your urine is light yellow or clear like water) to prevent dehydration. Choose water and other caffeine-free clear liquids. If you have kidney, heart, or liver disease and have to limit fluids, talk with your doctor before you increase the amount of fluids you drink. ¨ When you are able to eat, try clear soups, mild foods, and liquids until all symptoms are gone for 12 to 48 hours. Jell-O, dry toast, crackers, and cooked cereal are also good choices. · If you have not already done so, prepare a list of advance directives. Advance directives are instructions to your doctor and family members about what kind of care you want if you become unable to speak or express yourself. When should you call for help? Call 911 anytime you think you may need emergency care. For example, call if:  ? · You passed out (lost consciousness). ?Call your doctor now or seek immediate medical care if:  ? · You have new or worse pain. ? · You have new symptoms, such as a cough, belly pain, vomiting, diarrhea, or a rash. ? · You have symptoms of a urinary tract infection. For example:  ¨ You have blood or pus in your urine. ¨ You have pain in your back just below your rib cage. This is called flank pain. ¨ You have a fever, chills, or body aches. ¨ It hurts to urinate. ¨ You have groin or belly pain. ? Watch closely for changes in your health, and be sure to contact your doctor if:  ? · You have swollen glands in your armpits, groin, or neck. ? · You have trouble controlling your urine. ? · You do not get better as expected. Where can you learn more? Go to http://twila-jenny.info/. Enter V141 in the search box to learn more about \"Prostate Cancer: Care Instructions. \"  Current as of:  May 12, 2017  Content Version: 11.4  © 3730-3042 Healthwise, [employer]. Care instructions adapted under license by [employer] (which disclaims liability or warranty for this information). If you have questions about a medical condition or this instruction, always ask your healthcare professional. [address] any warranty or liability for your use of this information.

## 2018-04-05 NOTE — PROGRESS NOTES
Mr. Willie Lockett has a reminder for a \"due or due soon\" health maintenance. I have asked that he contact his primary care provider for follow-up on this health maintenance.

## 2018-04-05 NOTE — MR AVS SNAPSHOT
[address] [facility] [address] [facility] [address] 
[phone] Patient: Varsha Cash MRN: [id number] Visit Information Date & Time Provider Department Dept. Phone Encounter #  
 4/5/2018  3:00 PM Gino Lowry, Kryst[facility]  Upcoming Health Maintenance Date Due Hepatitis C Screening 1955 DTaP/Tdap/Td series (1 - Tdap) 10/4/1975 FOBT Q 1 YEAR AGE 50-75 10/4/2004 ZOSTER VACCINE AGE 60> 8/4/2014 Influenza Age 5 to Adult 8/1/2017 Allergies as of 4/5/2018  Review Complete On: 4/5/2018 By: Khushboo Harkins LPN No Known Allergies Current Immunizations  Never Reviewed No immunizations on file. Not reviewed this visit Vitals BP Pulse Height(growth percentile) Weight(growth percentile) SpO2 BMI  
 114/74 (BP 1 Location: Left arm, BP Patient Position: Sitting) 66 5' 10\" (1.778 m) 215 lb (97.5 kg) 94% 30.85 kg/m2 Smoking Status Former Smoker Vitals History BMI and BSA Data Body Mass Index Body Surface Area  
 30.85 kg/m 2 2.19 m 2 Preferred Pharmacy Pharmacy Name Phone [facility], [address] [phone] Your Updated Medication List  
  
   
This list is accurate as of 4/5/18  3:25 PM.  Always use your most recent med list.  
  
  
  
  
 lisinopril 2.5 mg tablet Commonly known as:  PRINIVIL, ZESTRIL  
  
 metFORMIN 500 mg tablet Commonly known as:  GLUCOPHAGE Patient Instructions Prostate Cancer: Care Instructions Your Care Instructions The prostate gland is a small, walnut-shaped organ that lies just below a man's bladder. It surrounds the urethra, the tube that carries urine from the bladder out of the body through the penis.  Prostate cancer is the abnormal growth of cells in the prostate gland. Prostate cancer cells can spread within the prostate, to nearby lymph nodes and other tissues, and to other parts of the body. When the cancer hasn't spread outside the prostate, it is called localized prostate cancer. With localized prostate cancer, your options depend on how likely it is that your cancer will grow. Tests will show if your cancer is likely to grow. · Low-risk cancer isn't likely to grow right away. If your cancer is low-risk, you can choose active surveillance. This means your cancer will be watched closely by your doctor with regular checkups and tests to see if the cancer grows. This choice allows you to delay having surgery or radiation, often for many years. If the cancer grows very slowly, you may never need treatment. · Medium-risk cancer is more likely to grow. Some men with this type of cancer may be able to choose active surveillance. Others may need to choose surgery or radiation. · High-risk cancer is most likely to grow. If you have high-risk cancer, you will likely need to choose surgery or radiation. If your cancer has already spread outside the prostate or to other parts of the body, then you may have other treatments, like chemotherapy or hormone therapy. If you are over age [de-identified] or have other serious health problems, like heart disease, you may choose not to have treatments to cure your cancer. Instead, you can just have treatments to manage your symptoms. This is called watchful waiting. Finding out that you have cancer is scary. You may feel many emotions and may need some help coping. Seek out family, friends, and counselors for support. You also can do things at home to make yourself feel better while you go through treatment. Call the [facility] (4-894.872.7342) or visit its website at [address]. org for more information. Follow-up care is a key part of your treatment and safety.  Be sure to make and go to all appointments, and call your doctor if you are having problems. It's also a good idea to know your test results and keep a list of the medicines you take. How can you care for yourself at home? · Your doctor will talk to you about your treatment options. You may need to learn more about each of them before you can decide which treatment is best for you. · Take your medicines exactly as prescribed. Call your doctor if you think you are having a problem with your medicine. You will get more details on the specific medicines your doctor prescribes. · Eat healthy food. If you do not feel like eating, try to eat food that has protein and extra calories to keep up your strength and prevent weight loss. Drink liquid meal replacements for extra calories and protein. Try to eat your main meal early. · Take steps to control your stress and workload. Learn relaxation techniques. ¨ Share your feelings. Stress and tension affect our emotions. By expressing your feelings to others, you may be able to understand and cope with them. ¨ Consider joining a support group. Talking about a problem with your spouse, a good friend, or other people with similar problems is a good way to reduce tension and stress. ¨ Express yourself through art. Try writing, crafts, dance, or art to relieve stress. Some dance, writing, or art groups may be available just for people who have cancer. ¨ Be kind to your body and mind. Getting enough sleep, eating a healthy diet, and taking time to do things you enjoy can contribute to an overall feeling of balance in your life and can help reduce stress. ¨ Get help if you need it. Discuss your concerns with your doctor or counselor. · Get some physical activity every day, but do not get too tired. Keep doing the hobbies you enjoy as your energy allows. · If you are vomiting or have diarrhea: ¨ Drink plenty of fluids (enough so that your urine is light yellow or clear like water) to prevent dehydration. Choose water and other caffeine-free clear liquids. If you have kidney, heart, or liver disease and have to limit fluids, talk with your doctor before you increase the amount of fluids you drink. ¨ When you are able to eat, try clear soups, mild foods, and liquids until all symptoms are gone for 12 to 48 hours. Jell-O, dry toast, crackers, and cooked cereal are also good choices. · If you have not already done so, prepare a list of advance directives. Advance directives are instructions to your doctor and family members about what kind of care you want if you become unable to speak or express yourself. When should you call for help? Call 911 anytime you think you may need emergency care. For example, call if: 
? · You passed out (lost consciousness). ?Call your doctor now or seek immediate medical care if: 
? · You have new or worse pain. ? · You have new symptoms, such as a cough, belly pain, vomiting, diarrhea, or a rash. ? · You have symptoms of a urinary tract infection. For example: ¨ You have blood or pus in your urine. ¨ You have pain in your back just below your rib cage. This is called flank pain. ¨ You have a fever, chills, or body aches. ¨ It hurts to urinate. ¨ You have groin or belly pain. ? Watch closely for changes in your health, and be sure to contact your doctor if: 
? · You have swollen glands in your armpits, groin, or neck. ? · You have trouble controlling your urine. ? · You do not get better as expected. Where can you learn more? Go to http://twila-jenny.info/. Enter V141 in the search box to learn more about \"Prostate Cancer: Care Instructions. \" Current as of: May 12, 2017 Content Version: 11.4 © 7990-2728 [employer].  Care instructions adapted under license by [employer] (which disclaims liability or warranty for this information). If you have questions about a medical condition or this instruction, always ask your healthcare professional. [address] any warranty or liability for your use of this information. Introducing [facility] & [facility] SERVICES! Paulette Silveira introduces [employer] patient portal. Now you can access parts of your medical record, email your doctor's office, and request medication refills online. 1. In your internet browser, go to [email or web address] 2. Click on the First Time User? Click Here link in the Sign In box. You will see the New Member Sign Up page. 3. Enter your [employer] Access Code exactly as it appears below. You will not need to use this code after youve completed the sign-up process. If you do not sign up before the expiration date, you must request a new code. · [employer] Access Code: [email or web address] Expires: 5/22/2018  2:29 PM 
 
4. Enter the last four digits of your Social Security Number (xxxx) and Date of Birth (mm/dd/yyyy) as indicated and click Submit. You will be taken to the next sign-up page. 5. Create a [employer] ID. This will be your [employer] login ID and cannot be changed, so think of one that is secure and easy to remember. 6. Create a [employer] password. You can change your password at any time. 7. Enter your Password Reset Question and Answer. This can be used at a later time if you forget your password. 8. Enter your e-mail address. You will receive e-mail notification when new information is available in [address] 9. Click Sign Up. You can now view and download portions of your medical record. 10. Click the Download Summary menu link to download a portable copy of your medical information. If you have questions, please visit the Frequently Asked Questions section of the [employer] website. Remember, [employer] is NOT to be used for urgent needs. For medical emergencies, dial 911. Now available from your iPhone and Android! Please provide this summary of care documentation to your next provider. Your primary care clinician is listed as Domingo Flowers. If you have any questions after today's visit, please call [phone].

## 2018-05-15 ENCOUNTER — OFFICE VISIT (OUTPATIENT)
Dept: UROLOGY | Age: 63
End: 2018-05-15

## 2018-05-15 VITALS
HEART RATE: 61 BPM | BODY MASS INDEX: 30.78 KG/M2 | DIASTOLIC BLOOD PRESSURE: 75 MMHG | WEIGHT: 215 LBS | SYSTOLIC BLOOD PRESSURE: 122 MMHG | OXYGEN SATURATION: 97 % | HEIGHT: 70 IN

## 2018-05-15 DIAGNOSIS — C61 CANCER OF PROSTATE (HCC): Primary | ICD-10-CM

## 2018-05-15 NOTE — MR AVS SNAPSHOT
[address] [place] [address] 
[phone] Patient: Nicholas Pham MRN: [id number] Visit Information Date & Time Provider Department Dept. Phone Encounter #  
 5/15/2018  1:45 PM Joo Lagos, [address] [place] [address] [facility] [phone] Upcoming Health Maintenance Date Due Hepatitis C Screening 1955 Pneumococcal 19-64 Highest Risk (1 of 3 - PCV13) 10/4/1973 DTaP/Tdap/Td series (1 - Tdap) 10/4/1975 FOBT Q 1 YEAR AGE 50-75 10/4/2004 ZOSTER VACCINE AGE 60> 8/4/2014 Influenza Age 5 to Adult 8/1/2018 Allergies as of 5/15/2018  Review Complete On: 5/15/2018 By: Benjaman Kocher, LPN No Known Allergies Current Immunizations  Never Reviewed No immunizations on file. Not reviewed this visit Vitals BP Pulse Height(growth percentile) Weight(growth percentile) SpO2 BMI  
 122/75 (BP 1 Location: Right arm, BP Patient Position: Sitting) 61 5' 10\" (1.778 m) 215 lb (97.5 kg) 97% 30.85 kg/m2 Smoking Status Former Smoker Vitals History BMI and BSA Data Body Mass Index Body Surface Area  
 30.85 kg/m 2 2.19 m 2 Preferred Pharmacy Pharmacy Name Phone [facility], [address] [phone] Your Updated Medication List  
  
   
This list is accurate as of 5/15/18  2:37 PM.  Always use your most recent med list.  
  
  
  
  
 lisinopril 2.5 mg tablet Commonly known as:  PRINIVIL, ZESTRIL  
  
 metFORMIN 500 mg tablet Commonly known as:  GLUCOPHAGE Patient Instructions Deciding Between Radiation and Surgery for Localized Prostate Cancer Deciding Between Radiation and Surgery for Localized Prostate Cancer What is localized prostate cancer? Prostate cancer is the abnormal growth of cells in the prostate gland. Cancer that has not spread outside the prostate gland is called localized prostate cancer. Men with localized prostate cancer have options for their care. Some men with a slowly growing cancer choose active surveillance. This means that their doctors will watch them closely with regular checkups and tests. If their cancer grows, then they can decide about treatment. And some men choose watchful waiting. This means they don't want any treatment that tries to cure their cancer. Other men choose to treat their cancer right away. They can choose to have radiation to destroy the cancer cells in the prostate. Or they can choose to have surgery to remove the prostate (radical prostatectomy). Tests show if a localized prostate cancer is likely to grow. · Low-risk means that the cancer isn't likely to grow right away. There is a chance that it may grow so slowly that it never causes any problems. · Medium-risk means that the cancer is more likely to grow. Most men will likely need treatment with radiation or surgery. · High-risk means that the cancer will most likely grow right away. Men will likely need treatment with radiation or surgery. Some men may have limited treatment choices because of their health. For example, a man who has serious health concerns may not be well enough to have surgery. Or a man who has a serious bowel disease such as ulcerative colitis may not be able to have radiation. Your doctor will help you know if you have options. This information is about choosing between radiation or surgery. What are the key points about this decision? · Radiation or surgery may be used to treat your prostate cancer. Both treatments work well. With either treatment, the chance of your cancer spreading is low.  
· Both treatments can have side effects, such as bladder, bowel, and erection problems. Radiation is more likely to cause bowel problems. Surgery is more likely to cause leaking urine or erection problems. · How you feel about each treatment may affect your choice. For example, you may choose surgery because you want the cancer removed through an operation. Or you may choose radiation because you want to avoid major surgery and its risks. Why might you choose surgery? If your goal is to treat the cancer by having your prostate removed, then you may want to choose surgery. For some men, the idea of \"getting the cancer out\" brings a sense of relief. For other men, avoiding radiation may be what is important to them. Why might you choose radiation? If your goal is to treat the cancer and avoid the risks of major surgery, then you may want to choose radiation. For some men, preserving their sexual function for as long as possible is what they value most. Having radiation rather than surgery may help avoid erection problems. Your decision To make the decision that is best for you, take the time to understand your options. Sometimes the medical facts will guide your decision. But a good decision also reflects what matters most to you. This includes how you feel about radiation or surgery and the likely side effects. · Find out about the treatments. What would your radiation treatment plan be like? Or if you choose surgery, how would your surgery be done? · Find out about the side effects. How important to you is being sexually active and being able to maintain an erection? If leaking urine is a side effect of your surgery, how much would that bother you? Think about what you value. Then talk with your doctor. Where can you learn more? Go to http://twila-jenny.info/. Enter A559 in the search box to learn more about \"Deciding Between Radiation and Surgery for Localized Prostate Cancer. \" Current as of: May 12, 2017 Content Version: 11.4 © 3686-8284 [employer], [employer]. Care instructions adapted under license by [employer] (which disclaims liability or warranty for this information). If you have questions about a medical condition or this instruction, always ask your healthcare professional. [address] any warranty or liability for your use of this information. Introducing [facility] & HEALTH SERVICES! [facility] introduces [employer] patient portal. Now you can access parts of your medical record, email your doctor's office, and request medication refills online. 1. In your internet browser, go to [email or web address] 2. Click on the First Time User? Click Here link in the Sign In box. You will see the New Member Sign Up page. 3. Enter your [employer] Access Code exactly as it appears below. You will not need to use this code after youve completed the sign-up process. If you do not sign up before the expiration date, you must request a new code. · [employer] Access Code: [email or web address] Expires: 5/22/2018  2:29 PM 
 
4. Enter the last four digits of your Social Security Number (xxxx) and Date of Birth (mm/dd/yyyy) as indicated and click Submit. You will be taken to the next sign-up page. 5. Create a [employer] ID. This will be your [employer] login ID and cannot be changed, so think of one that is secure and easy to remember. 6. Create a [employer] password. You can change your password at any time. 7. Enter your Password Reset Question and Answer. This can be used at a later time if you forget your password. 8. Enter your e-mail address. You will receive e-mail notification when new information is available in [address] 9. Click Sign Up. You can now view and download portions of your medical record. 10. Click the Download Summary menu link to download a portable copy of your medical information.  
 
If you have questions, please visit the Frequently Asked Questions section of the [employer]. Remember, "[employer]hart is NOT to be used for urgent needs. For medical emergencies, dial 911. Now available from your iPhone and Android! Please provide this summary of care documentation to your next provider. Your primary care clinician is listed as Domingo Flowers. If you have any questions after today's visit, please call [phone].

## 2018-05-15 NOTE — PROGRESS NOTES
The patient returns with his wife for discussion of his cancer. The patient has a left-sided stage T2b prostate cancer recent score 7, 3+4, with maximum PSA of 4.01. Maximum core involvement is 37%. We have had extended discussion regarding the categories of treatment including surveillance versus radiation versus surgery versus alternatives including proton beam.  I have advised the patient that I believe that the radiation therapy either in the form of external beam or radioactive seeds or a combination thereof is an alternative. Another equally valid alternative is radical prostatectomy. Pros and cons are discussed. I have advised that I would begin by sending the patient to a urologist with skill in the robotic approach for further discussion to be followed thereafter by consideration of radiation oncology consultation if the patient so desires her graph literature is provided    This visit exceeded 40 minutes and greater than 50% was counseling. The patient expresses understanding of the treatment plan and wishes to proceed    This dictation used voice recognition software and there may be mistakes.     Jeremy Garcia MD

## 2018-05-15 NOTE — PATIENT INSTRUCTIONS
Deciding Between Radiation and Surgery for Localized Prostate Cancer  Deciding Between Radiation and Surgery for Localized Prostate Cancer    What is localized prostate cancer? Prostate cancer is the abnormal growth of cells in the prostate gland. Cancer that has not spread outside the prostate gland is called localized prostate cancer. Men with localized prostate cancer have options for their care. Some men with a slowly growing cancer choose active surveillance. This means that their doctors will watch them closely with regular checkups and tests. If their cancer grows, then they can decide about treatment. And some men choose watchful waiting. This means they don't want any treatment that tries to cure their cancer. Other men choose to treat their cancer right away. They can choose to have radiation to destroy the cancer cells in the prostate. Or they can choose to have surgery to remove the prostate (radical prostatectomy). Tests show if a localized prostate cancer is likely to grow. · Low-risk means that the cancer isn't likely to grow right away. There is a chance that it may grow so slowly that it never causes any problems. · Medium-risk means that the cancer is more likely to grow. Most men will likely need treatment with radiation or surgery. · High-risk means that the cancer will most likely grow right away. Men will likely need treatment with radiation or surgery. Some men may have limited treatment choices because of their health. For example, a man who has serious health concerns may not be well enough to have surgery. Or a man who has a serious bowel disease such as ulcerative colitis may not be able to have radiation. Your doctor will help you know if you have options. This information is about choosing between radiation or surgery. What are the key points about this decision? · Radiation or surgery may be used to treat your prostate cancer. Both treatments work well.  With either treatment, the chance of your cancer spreading is low. · Both treatments can have side effects, such as bladder, bowel, and erection problems. Radiation is more likely to cause bowel problems. Surgery is more likely to cause leaking urine or erection problems. · How you feel about each treatment may affect your choice. For example, you may choose surgery because you want the cancer removed through an operation. Or you may choose radiation because you want to avoid major surgery and its risks. Why might you choose surgery? If your goal is to treat the cancer by having your prostate removed, then you may want to choose surgery. For some men, the idea of \"getting the cancer out\" brings a sense of relief. For other men, avoiding radiation may be what is important to them. Why might you choose radiation? If your goal is to treat the cancer and avoid the risks of major surgery, then you may want to choose radiation. For some men, preserving their sexual function for as long as possible is what they value most. Having radiation rather than surgery may help avoid erection problems. Your decision  To make the decision that is best for you, take the time to understand your options. Sometimes the medical facts will guide your decision. But a good decision also reflects what matters most to you. This includes how you feel about radiation or surgery and the likely side effects. · Find out about the treatments. What would your radiation treatment plan be like? Or if you choose surgery, how would your surgery be done? · Find out about the side effects. How important to you is being sexually active and being able to maintain an erection? If leaking urine is a side effect of your surgery, how much would that bother you? Think about what you value. Then talk with your doctor. Where can you learn more? Go to http://twila-jenny.info/.   Enter F828 in the search box to learn more about \"Deciding Between Radiation and Surgery for Localized Prostate Cancer. \"  Current as of: May 12, 2017  Content Version: 11.4  © 2055-1151 Healthwise, Incorporated. Care instructions adapted under license by [employer] (which disclaims liability or warranty for this information). If you have questions about a medical condition or this instruction, always ask your healthcare professional. [place] [address] any warranty or liability for your use of this information.

## 2018-05-15 NOTE — PROGRESS NOTES
Mr. Ardean Crigler has a reminder for a \"due or due soon\" health maintenance. I have asked that he contact his primary care provider for follow-up on this health maintenance.

## 2018-06-26 ENCOUNTER — HOSPITAL ENCOUNTER (OUTPATIENT)
Dept: PREADMISSION TESTING | Age: 63
Discharge: HOME | End: 2018-06-26
Payer: COMMERCIAL

## 2018-06-26 DIAGNOSIS — C61 PROSTATE CANCER (HCC): ICD-10-CM

## 2018-06-26 LAB
ABO + RH BLD: NORMAL
ANION GAP SERPL CALC-SCNC: 7 MMOL/L (ref 3–18)
APTT PPP: 30.3 SEC (ref 23–36.4)
ATRIAL RATE: 60 BPM
BLOOD GROUP ANTIBODIES SERPL: NORMAL
BUN SERPL-MCNC: 15 MG/DL (ref 7–18)
BUN/CREAT SERPL: 13 (ref 12–20)
CALCIUM SERPL-MCNC: 9 MG/DL (ref 8.5–10.1)
CALCULATED P AXIS, ECG09: 44 DEGREES
CALCULATED R AXIS, ECG10: -17 DEGREES
CALCULATED T AXIS, ECG11: 7 DEGREES
CHLORIDE SERPL-SCNC: 107 MMOL/L (ref 100–108)
CO2 SERPL-SCNC: 29 MMOL/L (ref 21–32)
CREAT SERPL-MCNC: 1.17 MG/DL (ref 0.6–1.3)
DIAGNOSIS, 93000: NORMAL
ERYTHROCYTE [DISTWIDTH] IN BLOOD BY AUTOMATED COUNT: 12.7 % (ref 11.6–14.5)
GLUCOSE SERPL-MCNC: 127 MG/DL (ref 74–99)
HCT VFR BLD AUTO: 46.2 % (ref 36–48)
HGB BLD-MCNC: 15.8 G/DL (ref 13–16)
INR PPP: 1 (ref 0.8–1.2)
MCH RBC QN AUTO: 29.2 PG (ref 24–34)
MCHC RBC AUTO-ENTMCNC: 34.2 G/DL (ref 31–37)
MCV RBC AUTO: 85.2 FL (ref 74–97)
P-R INTERVAL, ECG05: 156 MS
PLATELET # BLD AUTO: 184 K/UL (ref 135–420)
PMV BLD AUTO: 11.6 FL (ref 9.2–11.8)
POTASSIUM SERPL-SCNC: 4 MMOL/L (ref 3.5–5.5)
PROTHROMBIN TIME: 13.1 SEC (ref 11.5–15.2)
Q-T INTERVAL, ECG07: 400 MS
QRS DURATION, ECG06: 112 MS
QTC CALCULATION (BEZET), ECG08: 400 MS
RBC # BLD AUTO: 5.42 M/UL (ref 4.7–5.5)
SODIUM SERPL-SCNC: 143 MMOL/L (ref 136–145)
SPECIMEN EXP DATE BLD: NORMAL
VENTRICULAR RATE, ECG03: 60 BPM
WBC # BLD AUTO: 10.9 K/UL (ref 4.6–13.2)

## 2018-06-26 PROCEDURE — 85027 COMPLETE CBC AUTOMATED: CPT | Performed by: UROLOGY

## 2018-06-26 PROCEDURE — 93005 ELECTROCARDIOGRAM TRACING: CPT

## 2018-06-26 PROCEDURE — 86900 BLOOD TYPING SEROLOGIC ABO: CPT | Performed by: UROLOGY

## 2018-06-26 PROCEDURE — 80048 BASIC METABOLIC PNL TOTAL CA: CPT | Performed by: UROLOGY

## 2018-06-26 PROCEDURE — 85730 THROMBOPLASTIN TIME PARTIAL: CPT | Performed by: UROLOGY

## 2018-06-26 PROCEDURE — 85610 PROTHROMBIN TIME: CPT | Performed by: UROLOGY

## 2018-06-26 PROCEDURE — 36415 COLL VENOUS BLD VENIPUNCTURE: CPT | Performed by: UROLOGY

## 2018-06-26 RX ORDER — CEFAZOLIN SODIUM 2 G/50ML
2 SOLUTION INTRAVENOUS ONCE
Status: CANCELLED | OUTPATIENT
Start: 2018-06-28 | End: 2018-06-28

## 2018-06-26 RX ORDER — HEPARIN SODIUM 5000 [USP'U]/ML
5000 INJECTION, SOLUTION INTRAVENOUS; SUBCUTANEOUS ONCE
Status: CANCELLED | OUTPATIENT
Start: 2018-06-28 | End: 2018-06-28

## 2018-06-27 ENCOUNTER — ANESTHESIA EVENT (OUTPATIENT)
Dept: SURGERY | Age: 63
End: 2018-06-27
Payer: COMMERCIAL

## 2018-06-28 ENCOUNTER — HOSPITAL ENCOUNTER (OUTPATIENT)
Age: 63
Setting detail: OBSERVATION
Discharge: HOME | End: 2018-06-29
Attending: UROLOGY | Admitting: UROLOGY
Payer: COMMERCIAL

## 2018-06-28 ENCOUNTER — ANESTHESIA (OUTPATIENT)
Dept: SURGERY | Age: 63
End: 2018-06-28
Payer: COMMERCIAL

## 2018-06-28 PROBLEM — C61 PROSTATE CANCER (HCC): Status: ACTIVE | Noted: 2018-06-28

## 2018-06-28 LAB
EST. AVERAGE GLUCOSE BLD GHB EST-MCNC: 131 MG/DL
GLUCOSE BLD STRIP.AUTO-MCNC: 103 MG/DL (ref 70–110)
GLUCOSE BLD STRIP.AUTO-MCNC: 152 MG/DL (ref 70–110)
GLUCOSE BLD STRIP.AUTO-MCNC: 202 MG/DL (ref 70–110)
HBA1C MFR BLD: 6.2 % (ref 4.2–5.6)

## 2018-06-28 PROCEDURE — 99218 HC RM OBSERVATION: CPT

## 2018-06-28 PROCEDURE — 77030010939 HC CLP LIG TELE -B: Performed by: UROLOGY

## 2018-06-28 PROCEDURE — 74011250636 HC RX REV CODE- 250/636: Performed by: UROLOGY

## 2018-06-28 PROCEDURE — 77030010517 HC APPL SEAL FLOSEL BAXT -B: Performed by: UROLOGY

## 2018-06-28 PROCEDURE — 77030039266 HC ADH SKN EXOFIN S2SG -A: Performed by: UROLOGY

## 2018-06-28 PROCEDURE — 74011000250 HC RX REV CODE- 250

## 2018-06-28 PROCEDURE — 88307 TISSUE EXAM BY PATHOLOGIST: CPT | Performed by: UROLOGY

## 2018-06-28 PROCEDURE — 77030008756 HC TU IRR SUC STRY -B: Performed by: UROLOGY

## 2018-06-28 PROCEDURE — 77030014650 HC SEAL MTRX FLOSEL BAXT -C: Performed by: UROLOGY

## 2018-06-28 PROCEDURE — 74011636637 HC RX REV CODE- 636/637: Performed by: UROLOGY

## 2018-06-28 PROCEDURE — 77030031139 HC SUT VCRL2 J&J -A: Performed by: UROLOGY

## 2018-06-28 PROCEDURE — 77030008683 HC TU ET CUF COVD -A: Performed by: NURSE ANESTHETIST, CERTIFIED REGISTERED

## 2018-06-28 PROCEDURE — 77030020703 HC SEAL CANN DISP INTU -B: Performed by: UROLOGY

## 2018-06-28 PROCEDURE — 77030010935 HC CLP LIG ABSRB TELE -B: Performed by: UROLOGY

## 2018-06-28 PROCEDURE — 77030016151 HC PROTCTR LNS DFOG COVD -B: Performed by: UROLOGY

## 2018-06-28 PROCEDURE — 74011636637 HC RX REV CODE- 636/637: Performed by: NURSE ANESTHETIST, CERTIFIED REGISTERED

## 2018-06-28 PROCEDURE — 74011250637 HC RX REV CODE- 250/637: Performed by: NURSE ANESTHETIST, CERTIFIED REGISTERED

## 2018-06-28 PROCEDURE — 77030018846 HC SOL IRR STRL H20 ICUM -A: Performed by: UROLOGY

## 2018-06-28 PROCEDURE — 77030011640 HC PAD GRND REM COVD -A: Performed by: UROLOGY

## 2018-06-28 PROCEDURE — 74011250636 HC RX REV CODE- 250/636

## 2018-06-28 PROCEDURE — 77030003028 HC SUT VCRL J&J -A: Performed by: UROLOGY

## 2018-06-28 PROCEDURE — 77030007955 HC PCH ENDOSC SPEC J&J -B: Performed by: UROLOGY

## 2018-06-28 PROCEDURE — 74011000250 HC RX REV CODE- 250: Performed by: UROLOGY

## 2018-06-28 PROCEDURE — 77030038020 HC MANFLD NEPTUNE STRY -B: Performed by: UROLOGY

## 2018-06-28 PROCEDURE — 77030020788: Performed by: UROLOGY

## 2018-06-28 PROCEDURE — 77030012012 HC AIRWY OP SFT TELE -A: Performed by: NURSE ANESTHETIST, CERTIFIED REGISTERED

## 2018-06-28 PROCEDURE — 77030002933 HC SUT MCRYL J&J -A: Performed by: UROLOGY

## 2018-06-28 PROCEDURE — 74011250636 HC RX REV CODE- 250/636: Performed by: NURSE ANESTHETIST, CERTIFIED REGISTERED

## 2018-06-28 PROCEDURE — 77030034696 HC CATH URETH FOL 2W BARD -A: Performed by: UROLOGY

## 2018-06-28 PROCEDURE — 77030009848 HC PASSR SUT SET COOP -C: Performed by: UROLOGY

## 2018-06-28 PROCEDURE — 77030034850: Performed by: UROLOGY

## 2018-06-28 PROCEDURE — 77030026438 HC STYL ET INTUB CARD -A: Performed by: NURSE ANESTHETIST, CERTIFIED REGISTERED

## 2018-06-28 PROCEDURE — 83036 HEMOGLOBIN GLYCOSYLATED A1C: CPT | Performed by: UROLOGY

## 2018-06-28 PROCEDURE — 36415 COLL VENOUS BLD VENIPUNCTURE: CPT | Performed by: UROLOGY

## 2018-06-28 PROCEDURE — 76060000039 HC ANESTHESIA 4 TO 4.5 HR: Performed by: UROLOGY

## 2018-06-28 PROCEDURE — 77030027138 HC INCENT SPIROMETER -A

## 2018-06-28 PROCEDURE — 76010000880 HC OR TIME 4 TO 4.5HR INTENSV - TIER 2: Performed by: UROLOGY

## 2018-06-28 PROCEDURE — 74011250637 HC RX REV CODE- 250/637

## 2018-06-28 PROCEDURE — 77030022704 HC SUT VLOC COVD -B: Performed by: UROLOGY

## 2018-06-28 PROCEDURE — 76210000006 HC OR PH I REC 0.5 TO 1 HR: Performed by: UROLOGY

## 2018-06-28 PROCEDURE — 77030035048 HC TRCR ENDOSC OPTCL COVD -B: Performed by: UROLOGY

## 2018-06-28 PROCEDURE — 88309 TISSUE EXAM BY PATHOLOGIST: CPT | Performed by: UROLOGY

## 2018-06-28 PROCEDURE — 77030020263 HC SOL INJ SOD CL0.9% LFCR 1000ML: Performed by: UROLOGY

## 2018-06-28 PROCEDURE — 77030013079 HC BLNKT BAIR HGGR 3M -A: Performed by: NURSE ANESTHETIST, CERTIFIED REGISTERED

## 2018-06-28 PROCEDURE — 88305 TISSUE EXAM BY PATHOLOGIST: CPT | Performed by: UROLOGY

## 2018-06-28 PROCEDURE — 74011250637 HC RX REV CODE- 250/637: Performed by: UROLOGY

## 2018-06-28 PROCEDURE — 82962 GLUCOSE BLOOD TEST: CPT

## 2018-06-28 RX ORDER — ONDANSETRON 2 MG/ML
4 INJECTION INTRAMUSCULAR; INTRAVENOUS
Status: DISCONTINUED | OUTPATIENT
Start: 2018-06-28 | End: 2018-06-29 | Stop reason: HOSPADM

## 2018-06-28 RX ORDER — FENTANYL CITRATE 50 UG/ML
50 INJECTION, SOLUTION INTRAMUSCULAR; INTRAVENOUS AS NEEDED
Status: DISCONTINUED | OUTPATIENT
Start: 2018-06-28 | End: 2018-06-28 | Stop reason: HOSPADM

## 2018-06-28 RX ORDER — DEXTROSE 50 % IN WATER (D50W) INTRAVENOUS SYRINGE
25-50 AS NEEDED
Status: DISCONTINUED | OUTPATIENT
Start: 2018-06-28 | End: 2018-06-28 | Stop reason: ALTCHOICE

## 2018-06-28 RX ORDER — FAMOTIDINE 20 MG/1
20 TABLET, FILM COATED ORAL ONCE
Status: COMPLETED | OUTPATIENT
Start: 2018-06-28 | End: 2018-06-28

## 2018-06-28 RX ORDER — EPHEDRINE SULFATE/0.9% NACL/PF 25 MG/5 ML
SYRINGE (ML) INTRAVENOUS AS NEEDED
Status: DISCONTINUED | OUTPATIENT
Start: 2018-06-28 | End: 2018-06-28 | Stop reason: HOSPADM

## 2018-06-28 RX ORDER — LIDOCAINE HYDROCHLORIDE 20 MG/ML
INJECTION, SOLUTION EPIDURAL; INFILTRATION; INTRACAUDAL; PERINEURAL AS NEEDED
Status: DISCONTINUED | OUTPATIENT
Start: 2018-06-28 | End: 2018-06-28 | Stop reason: HOSPADM

## 2018-06-28 RX ORDER — FENTANYL CITRATE 50 UG/ML
INJECTION, SOLUTION INTRAMUSCULAR; INTRAVENOUS AS NEEDED
Status: DISCONTINUED | OUTPATIENT
Start: 2018-06-28 | End: 2018-06-28 | Stop reason: HOSPADM

## 2018-06-28 RX ORDER — LIDOCAINE HYDROCHLORIDE 10 MG/ML
0.1 INJECTION, SOLUTION EPIDURAL; INFILTRATION; INTRACAUDAL; PERINEURAL AS NEEDED
Status: DISCONTINUED | OUTPATIENT
Start: 2018-06-28 | End: 2018-06-28 | Stop reason: ALTCHOICE

## 2018-06-28 RX ORDER — MAGNESIUM SULFATE 100 %
4 CRYSTALS MISCELLANEOUS AS NEEDED
Status: DISCONTINUED | OUTPATIENT
Start: 2018-06-28 | End: 2018-06-28 | Stop reason: HOSPADM

## 2018-06-28 RX ORDER — SODIUM CHLORIDE 0.9 % (FLUSH) 0.9 %
5-10 SYRINGE (ML) INJECTION EVERY 8 HOURS
Status: DISCONTINUED | OUTPATIENT
Start: 2018-06-28 | End: 2018-06-29 | Stop reason: HOSPADM

## 2018-06-28 RX ORDER — OXYCODONE AND ACETAMINOPHEN 5; 325 MG/1; MG/1
1 TABLET ORAL
Status: DISCONTINUED | OUTPATIENT
Start: 2018-06-28 | End: 2018-06-29 | Stop reason: HOSPADM

## 2018-06-28 RX ORDER — DOCUSATE SODIUM 100 MG/1
100 CAPSULE, LIQUID FILLED ORAL 2 TIMES DAILY
Qty: 40 CAP | Refills: 0 | Status: SHIPPED | OUTPATIENT
Start: 2018-06-28 | End: 2018-07-18

## 2018-06-28 RX ORDER — PROPOFOL 10 MG/ML
INJECTION, EMULSION INTRAVENOUS AS NEEDED
Status: DISCONTINUED | OUTPATIENT
Start: 2018-06-28 | End: 2018-06-28 | Stop reason: HOSPADM

## 2018-06-28 RX ORDER — SODIUM CHLORIDE 9 MG/ML
125 INJECTION, SOLUTION INTRAVENOUS CONTINUOUS
Status: DISCONTINUED | OUTPATIENT
Start: 2018-06-28 | End: 2018-06-29 | Stop reason: HOSPADM

## 2018-06-28 RX ORDER — SODIUM CHLORIDE, SODIUM LACTATE, POTASSIUM CHLORIDE, CALCIUM CHLORIDE 600; 310; 30; 20 MG/100ML; MG/100ML; MG/100ML; MG/100ML
INJECTION, SOLUTION INTRAVENOUS
Status: DISCONTINUED | OUTPATIENT
Start: 2018-06-28 | End: 2018-06-28 | Stop reason: HOSPADM

## 2018-06-28 RX ORDER — LISINOPRIL 5 MG/1
2.5 TABLET ORAL DAILY
Status: DISCONTINUED | OUTPATIENT
Start: 2018-06-29 | End: 2018-06-29 | Stop reason: HOSPADM

## 2018-06-28 RX ORDER — HYDROCODONE BITARTRATE AND ACETAMINOPHEN 5; 325 MG/1; MG/1
1 TABLET ORAL ONCE
Status: DISCONTINUED | OUTPATIENT
Start: 2018-06-28 | End: 2018-06-28 | Stop reason: HOSPADM

## 2018-06-28 RX ORDER — ONDANSETRON 2 MG/ML
4 INJECTION INTRAMUSCULAR; INTRAVENOUS ONCE
Status: DISCONTINUED | OUTPATIENT
Start: 2018-06-28 | End: 2018-06-28 | Stop reason: HOSPADM

## 2018-06-28 RX ORDER — DEXTROSE 50 % IN WATER (D50W) INTRAVENOUS SYRINGE
25-50 AS NEEDED
Status: DISCONTINUED | OUTPATIENT
Start: 2018-06-28 | End: 2018-06-28 | Stop reason: HOSPADM

## 2018-06-28 RX ORDER — DIPHENHYDRAMINE HYDROCHLORIDE 50 MG/ML
25 INJECTION, SOLUTION INTRAMUSCULAR; INTRAVENOUS
Status: DISCONTINUED | OUTPATIENT
Start: 2018-06-28 | End: 2018-06-28 | Stop reason: HOSPADM

## 2018-06-28 RX ORDER — OXYCODONE AND ACETAMINOPHEN 5; 325 MG/1; MG/1
1-2 TABLET ORAL
Qty: 31 TAB | Refills: 0 | Status: SHIPPED | OUTPATIENT
Start: 2018-06-28 | End: 2018-10-22

## 2018-06-28 RX ORDER — DOCUSATE SODIUM 100 MG/1
100 CAPSULE, LIQUID FILLED ORAL 2 TIMES DAILY
Status: DISCONTINUED | OUTPATIENT
Start: 2018-06-28 | End: 2018-06-29 | Stop reason: HOSPADM

## 2018-06-28 RX ORDER — DEXTROSE 50 % IN WATER (D50W) INTRAVENOUS SYRINGE
25-50 AS NEEDED
Status: DISCONTINUED | OUTPATIENT
Start: 2018-06-28 | End: 2018-06-29 | Stop reason: HOSPADM

## 2018-06-28 RX ORDER — ALBUTEROL SULFATE 90 UG/1
AEROSOL, METERED RESPIRATORY (INHALATION) AS NEEDED
Status: DISCONTINUED | OUTPATIENT
Start: 2018-06-28 | End: 2018-06-28 | Stop reason: HOSPADM

## 2018-06-28 RX ORDER — SUCCINYLCHOLINE CHLORIDE 20 MG/ML
INJECTION INTRAMUSCULAR; INTRAVENOUS AS NEEDED
Status: DISCONTINUED | OUTPATIENT
Start: 2018-06-28 | End: 2018-06-28 | Stop reason: HOSPADM

## 2018-06-28 RX ORDER — VECURONIUM BROMIDE FOR INJECTION 1 MG/ML
INJECTION, POWDER, LYOPHILIZED, FOR SOLUTION INTRAVENOUS AS NEEDED
Status: DISCONTINUED | OUTPATIENT
Start: 2018-06-28 | End: 2018-06-28 | Stop reason: HOSPADM

## 2018-06-28 RX ORDER — INSULIN LISPRO 100 [IU]/ML
INJECTION, SOLUTION INTRAVENOUS; SUBCUTANEOUS
Status: DISCONTINUED | OUTPATIENT
Start: 2018-06-28 | End: 2018-06-29 | Stop reason: HOSPADM

## 2018-06-28 RX ORDER — MIDAZOLAM HYDROCHLORIDE 1 MG/ML
INJECTION, SOLUTION INTRAMUSCULAR; INTRAVENOUS AS NEEDED
Status: DISCONTINUED | OUTPATIENT
Start: 2018-06-28 | End: 2018-06-28 | Stop reason: HOSPADM

## 2018-06-28 RX ORDER — ENOXAPARIN SODIUM 100 MG/ML
40 INJECTION SUBCUTANEOUS DAILY
Qty: 28 SYRINGE | Refills: 0 | Status: SHIPPED | OUTPATIENT
Start: 2018-06-28

## 2018-06-28 RX ORDER — HEPARIN SODIUM 5000 [USP'U]/ML
5000 INJECTION, SOLUTION INTRAVENOUS; SUBCUTANEOUS ONCE
Status: COMPLETED | OUTPATIENT
Start: 2018-06-28 | End: 2018-06-28

## 2018-06-28 RX ORDER — OXYBUTYNIN CHLORIDE 5 MG/1
5 TABLET ORAL
Status: DISCONTINUED | OUTPATIENT
Start: 2018-06-28 | End: 2018-06-29 | Stop reason: HOSPADM

## 2018-06-28 RX ORDER — BUPIVACAINE HYDROCHLORIDE 2.5 MG/ML
INJECTION, SOLUTION EPIDURAL; INFILTRATION; INTRACAUDAL AS NEEDED
Status: DISCONTINUED | OUTPATIENT
Start: 2018-06-28 | End: 2018-06-28 | Stop reason: HOSPADM

## 2018-06-28 RX ORDER — INSULIN LISPRO 100 [IU]/ML
INJECTION, SOLUTION INTRAVENOUS; SUBCUTANEOUS ONCE
Status: DISCONTINUED | OUTPATIENT
Start: 2018-06-28 | End: 2018-06-28 | Stop reason: ALTCHOICE

## 2018-06-28 RX ORDER — MAGNESIUM SULFATE 100 %
4 CRYSTALS MISCELLANEOUS AS NEEDED
Status: DISCONTINUED | OUTPATIENT
Start: 2018-06-28 | End: 2018-06-29 | Stop reason: HOSPADM

## 2018-06-28 RX ORDER — HYDROMORPHONE HYDROCHLORIDE 2 MG/ML
INJECTION, SOLUTION INTRAMUSCULAR; INTRAVENOUS; SUBCUTANEOUS AS NEEDED
Status: DISCONTINUED | OUTPATIENT
Start: 2018-06-28 | End: 2018-06-28 | Stop reason: HOSPADM

## 2018-06-28 RX ORDER — HEPARIN SODIUM 5000 [USP'U]/ML
5000 INJECTION, SOLUTION INTRAVENOUS; SUBCUTANEOUS EVERY 8 HOURS
Status: DISCONTINUED | OUTPATIENT
Start: 2018-06-28 | End: 2018-06-29 | Stop reason: HOSPADM

## 2018-06-28 RX ORDER — SODIUM CHLORIDE, SODIUM LACTATE, POTASSIUM CHLORIDE, CALCIUM CHLORIDE 600; 310; 30; 20 MG/100ML; MG/100ML; MG/100ML; MG/100ML
75 INJECTION, SOLUTION INTRAVENOUS CONTINUOUS
Status: DISCONTINUED | OUTPATIENT
Start: 2018-06-28 | End: 2018-06-28 | Stop reason: ALTCHOICE

## 2018-06-28 RX ORDER — MORPHINE SULFATE 2 MG/ML
2 INJECTION, SOLUTION INTRAMUSCULAR; INTRAVENOUS
Status: DISCONTINUED | OUTPATIENT
Start: 2018-06-28 | End: 2018-06-28 | Stop reason: HOSPADM

## 2018-06-28 RX ORDER — ROCURONIUM BROMIDE 10 MG/ML
INJECTION, SOLUTION INTRAVENOUS AS NEEDED
Status: DISCONTINUED | OUTPATIENT
Start: 2018-06-28 | End: 2018-06-28 | Stop reason: HOSPADM

## 2018-06-28 RX ORDER — SODIUM CHLORIDE, SODIUM LACTATE, POTASSIUM CHLORIDE, CALCIUM CHLORIDE 600; 310; 30; 20 MG/100ML; MG/100ML; MG/100ML; MG/100ML
75 INJECTION, SOLUTION INTRAVENOUS CONTINUOUS
Status: DISCONTINUED | OUTPATIENT
Start: 2018-06-28 | End: 2018-06-28 | Stop reason: HOSPADM

## 2018-06-28 RX ORDER — FENTANYL CITRATE 50 UG/ML
50 INJECTION, SOLUTION INTRAMUSCULAR; INTRAVENOUS
Status: DISCONTINUED | OUTPATIENT
Start: 2018-06-28 | End: 2018-06-28 | Stop reason: HOSPADM

## 2018-06-28 RX ORDER — INSULIN LISPRO 100 [IU]/ML
INJECTION, SOLUTION INTRAVENOUS; SUBCUTANEOUS ONCE
Status: DISCONTINUED | OUTPATIENT
Start: 2018-06-28 | End: 2018-06-28 | Stop reason: HOSPADM

## 2018-06-28 RX ORDER — ONDANSETRON 2 MG/ML
INJECTION INTRAMUSCULAR; INTRAVENOUS AS NEEDED
Status: DISCONTINUED | OUTPATIENT
Start: 2018-06-28 | End: 2018-06-28 | Stop reason: HOSPADM

## 2018-06-28 RX ORDER — CEFAZOLIN SODIUM 2 G/50ML
2 SOLUTION INTRAVENOUS ONCE
Status: COMPLETED | OUTPATIENT
Start: 2018-06-28 | End: 2018-06-28

## 2018-06-28 RX ORDER — SODIUM CHLORIDE 0.9 % (FLUSH) 0.9 %
5-10 SYRINGE (ML) INJECTION AS NEEDED
Status: DISCONTINUED | OUTPATIENT
Start: 2018-06-28 | End: 2018-06-28 | Stop reason: HOSPADM

## 2018-06-28 RX ORDER — CEFAZOLIN SODIUM 2 G/50ML
2 SOLUTION INTRAVENOUS EVERY 8 HOURS
Status: CANCELLED | OUTPATIENT
Start: 2018-06-28 | End: 2018-06-29

## 2018-06-28 RX ORDER — DEXAMETHASONE SODIUM PHOSPHATE 4 MG/ML
INJECTION, SOLUTION INTRA-ARTICULAR; INTRALESIONAL; INTRAMUSCULAR; INTRAVENOUS; SOFT TISSUE AS NEEDED
Status: DISCONTINUED | OUTPATIENT
Start: 2018-06-28 | End: 2018-06-28 | Stop reason: HOSPADM

## 2018-06-28 RX ORDER — GLYCOPYRROLATE 0.2 MG/ML
INJECTION INTRAMUSCULAR; INTRAVENOUS AS NEEDED
Status: DISCONTINUED | OUTPATIENT
Start: 2018-06-28 | End: 2018-06-28 | Stop reason: HOSPADM

## 2018-06-28 RX ORDER — ACETAMINOPHEN 325 MG/1
650 TABLET ORAL
Status: DISCONTINUED | OUTPATIENT
Start: 2018-06-28 | End: 2018-06-29 | Stop reason: HOSPADM

## 2018-06-28 RX ORDER — INSULIN LISPRO 100 [IU]/ML
INJECTION, SOLUTION INTRAVENOUS; SUBCUTANEOUS ONCE
Status: COMPLETED | OUTPATIENT
Start: 2018-06-28 | End: 2018-06-28

## 2018-06-28 RX ORDER — NEOSTIGMINE METHYLSULFATE 5 MG/5 ML
SYRINGE (ML) INTRAVENOUS AS NEEDED
Status: DISCONTINUED | OUTPATIENT
Start: 2018-06-28 | End: 2018-06-28 | Stop reason: HOSPADM

## 2018-06-28 RX ORDER — SODIUM CHLORIDE 0.9 % (FLUSH) 0.9 %
5-10 SYRINGE (ML) INJECTION AS NEEDED
Status: DISCONTINUED | OUTPATIENT
Start: 2018-06-28 | End: 2018-06-29 | Stop reason: HOSPADM

## 2018-06-28 RX ORDER — ZOLPIDEM TARTRATE 5 MG/1
5 TABLET ORAL
Status: DISCONTINUED | OUTPATIENT
Start: 2018-06-28 | End: 2018-06-29 | Stop reason: HOSPADM

## 2018-06-28 RX ORDER — MORPHINE SULFATE 2 MG/ML
2 INJECTION, SOLUTION INTRAMUSCULAR; INTRAVENOUS
Status: DISCONTINUED | OUTPATIENT
Start: 2018-06-28 | End: 2018-06-29 | Stop reason: HOSPADM

## 2018-06-28 RX ADMIN — ONDANSETRON 4 MG: 2 INJECTION INTRAMUSCULAR; INTRAVENOUS at 16:28

## 2018-06-28 RX ADMIN — SODIUM CHLORIDE, SODIUM LACTATE, POTASSIUM CHLORIDE, AND CALCIUM CHLORIDE 75 ML/HR: 600; 310; 30; 20 INJECTION, SOLUTION INTRAVENOUS at 10:28

## 2018-06-28 RX ADMIN — VECURONIUM BROMIDE FOR INJECTION 2 MG: 1 INJECTION, POWDER, LYOPHILIZED, FOR SOLUTION INTRAVENOUS at 13:26

## 2018-06-28 RX ADMIN — FAMOTIDINE 20 MG: 20 TABLET ORAL at 10:28

## 2018-06-28 RX ADMIN — HYDROMORPHONE HYDROCHLORIDE 0.5 MG: 2 INJECTION, SOLUTION INTRAMUSCULAR; INTRAVENOUS; SUBCUTANEOUS at 14:06

## 2018-06-28 RX ADMIN — FENTANYL CITRATE 100 MCG: 50 INJECTION, SOLUTION INTRAMUSCULAR; INTRAVENOUS at 12:51

## 2018-06-28 RX ADMIN — VECURONIUM BROMIDE FOR INJECTION 1 MG: 1 INJECTION, POWDER, LYOPHILIZED, FOR SOLUTION INTRAVENOUS at 15:10

## 2018-06-28 RX ADMIN — VECURONIUM BROMIDE FOR INJECTION 2 MG: 1 INJECTION, POWDER, LYOPHILIZED, FOR SOLUTION INTRAVENOUS at 13:50

## 2018-06-28 RX ADMIN — Medication 3 MG: at 16:27

## 2018-06-28 RX ADMIN — SUCCINYLCHOLINE CHLORIDE 140 MG: 20 INJECTION INTRAMUSCULAR; INTRAVENOUS at 12:52

## 2018-06-28 RX ADMIN — Medication 5 MG: at 14:57

## 2018-06-28 RX ADMIN — OXYCODONE HYDROCHLORIDE AND ACETAMINOPHEN 1 TABLET: 5; 325 TABLET ORAL at 23:29

## 2018-06-28 RX ADMIN — DOCUSATE SODIUM 100 MG: 100 CAPSULE, LIQUID FILLED ORAL at 19:23

## 2018-06-28 RX ADMIN — OXYBUTYNIN CHLORIDE 5 MG: 5 TABLET ORAL at 19:23

## 2018-06-28 RX ADMIN — VECURONIUM BROMIDE FOR INJECTION 1 MG: 1 INJECTION, POWDER, LYOPHILIZED, FOR SOLUTION INTRAVENOUS at 15:35

## 2018-06-28 RX ADMIN — ALBUTEROL SULFATE 3 PUFF: 90 AEROSOL, METERED RESPIRATORY (INHALATION) at 15:43

## 2018-06-28 RX ADMIN — LIDOCAINE HYDROCHLORIDE 60 MG: 20 INJECTION, SOLUTION EPIDURAL; INFILTRATION; INTRACAUDAL; PERINEURAL at 12:51

## 2018-06-28 RX ADMIN — OXYCODONE HYDROCHLORIDE AND ACETAMINOPHEN 1 TABLET: 5; 325 TABLET ORAL at 19:23

## 2018-06-28 RX ADMIN — HYDROMORPHONE HYDROCHLORIDE 0.5 MG: 2 INJECTION, SOLUTION INTRAMUSCULAR; INTRAVENOUS; SUBCUTANEOUS at 13:11

## 2018-06-28 RX ADMIN — HEPARIN SODIUM 5000 UNITS: 5000 INJECTION, SOLUTION INTRAVENOUS; SUBCUTANEOUS at 21:20

## 2018-06-28 RX ADMIN — SODIUM CHLORIDE 125 ML/HR: 900 INJECTION, SOLUTION INTRAVENOUS at 21:13

## 2018-06-28 RX ADMIN — SODIUM CHLORIDE, SODIUM LACTATE, POTASSIUM CHLORIDE, AND CALCIUM CHLORIDE: 600; 310; 30; 20 INJECTION, SOLUTION INTRAVENOUS at 14:18

## 2018-06-28 RX ADMIN — GLYCOPYRROLATE 0.2 MG: 0.2 INJECTION INTRAMUSCULAR; INTRAVENOUS at 12:49

## 2018-06-28 RX ADMIN — ALBUTEROL SULFATE 3 PUFF: 90 AEROSOL, METERED RESPIRATORY (INHALATION) at 14:17

## 2018-06-28 RX ADMIN — ROCURONIUM BROMIDE 5 MG: 10 INJECTION, SOLUTION INTRAVENOUS at 12:51

## 2018-06-28 RX ADMIN — CEFAZOLIN SODIUM 2 G: 2 SOLUTION INTRAVENOUS at 12:43

## 2018-06-28 RX ADMIN — Medication 5 MG: at 15:09

## 2018-06-28 RX ADMIN — DEXAMETHASONE SODIUM PHOSPHATE 4 MG: 4 INJECTION, SOLUTION INTRA-ARTICULAR; INTRALESIONAL; INTRAMUSCULAR; INTRAVENOUS; SOFT TISSUE at 16:22

## 2018-06-28 RX ADMIN — MIDAZOLAM HYDROCHLORIDE 2 MG: 1 INJECTION, SOLUTION INTRAMUSCULAR; INTRAVENOUS at 12:42

## 2018-06-28 RX ADMIN — VECURONIUM BROMIDE FOR INJECTION 2 MG: 1 INJECTION, POWDER, LYOPHILIZED, FOR SOLUTION INTRAVENOUS at 14:04

## 2018-06-28 RX ADMIN — INSULIN LISPRO 4 UNITS: 100 INJECTION, SOLUTION INTRAVENOUS; SUBCUTANEOUS at 23:29

## 2018-06-28 RX ADMIN — SODIUM CHLORIDE, SODIUM LACTATE, POTASSIUM CHLORIDE, CALCIUM CHLORIDE: 600; 310; 30; 20 INJECTION, SOLUTION INTRAVENOUS at 13:00

## 2018-06-28 RX ADMIN — GLYCOPYRROLATE 0.4 MG: 0.2 INJECTION INTRAMUSCULAR; INTRAVENOUS at 16:27

## 2018-06-28 RX ADMIN — PROPOFOL 150 MG: 10 INJECTION, EMULSION INTRAVENOUS at 12:51

## 2018-06-28 RX ADMIN — HYDROMORPHONE HYDROCHLORIDE 0.5 MG: 2 INJECTION, SOLUTION INTRAMUSCULAR; INTRAVENOUS; SUBCUTANEOUS at 13:27

## 2018-06-28 RX ADMIN — Medication 10 ML: at 23:29

## 2018-06-28 RX ADMIN — HEPARIN SODIUM 5000 UNITS: 5000 INJECTION, SOLUTION INTRAVENOUS; SUBCUTANEOUS at 10:31

## 2018-06-28 RX ADMIN — GLYCOPYRROLATE 0.2 MG: 0.2 INJECTION INTRAMUSCULAR; INTRAVENOUS at 13:16

## 2018-06-28 RX ADMIN — VECURONIUM BROMIDE FOR INJECTION 4 MG: 1 INJECTION, POWDER, LYOPHILIZED, FOR SOLUTION INTRAVENOUS at 13:00

## 2018-06-28 RX ADMIN — Medication 10 MG: at 13:18

## 2018-06-28 RX ADMIN — INSULIN LISPRO 3 UNITS: 100 INJECTION, SOLUTION INTRAVENOUS; SUBCUTANEOUS at 17:36

## 2018-06-28 RX ADMIN — ALBUTEROL SULFATE 3 PUFF: 90 AEROSOL, METERED RESPIRATORY (INHALATION) at 14:14

## 2018-06-28 RX ADMIN — HYDROMORPHONE HYDROCHLORIDE 0.5 MG: 2 INJECTION, SOLUTION INTRAMUSCULAR; INTRAVENOUS; SUBCUTANEOUS at 16:31

## 2018-06-28 NOTE — IP AVS SNAPSHOT
[address] Patient: Russel Agustin MRN: [id number] A check ksenia indicates which time of day the medication should be taken. My Medications START taking these medications Instructions Each Dose to Equal  
 Morning Noon Evening Bedtime  
 docusate sodium 100 mg capsule Commonly known as:  Magda Downs Your next dose is:  6/29 pm   
   
 Take 1 Cap by mouth two (2) times a day for 20 days. 100 mg  
    
  
   
   
  
   
  
 enoxaparin 40 mg/0.4 mL Commonly known as:  LOVENOX Your next dose is:  6/30 am   
   
 0.4 mL by SubCUTAneous route daily. 40 mg  
    
  
   
   
   
  
 oxyCODONE-acetaminophen 5-325 mg per tablet Commonly known as:  PERCOCET Your next dose is:  3:50 pm if needed and every 4 hours thereafter if needed Take 1-2 Tabs by mouth every four (4) hours as needed for Pain. Max Daily Amount: 12 Tabs. 1-2 Tab CONTINUE taking these medications Instructions Each Dose to Equal  
 Morning Noon Evening Bedtime  
 lisinopril 2.5 mg tablet Commonly known as:  [place] uLndyFloyd Your next dose is:  6/30 am   
   
 2.5 mg daily. Patient states he takes for kidney protection 2.5 mg  
    
  
   
   
   
  
 metFORMIN 500 mg tablet Commonly known as:  GLUCOPHAGE Your next dose is:  6/29 pm   
   
 two (2) times daily (with meals). Patient states he takes 1/2 tab BID Where to Get Your Medications Information on where to get these meds will be given to you by the nurse or doctor. ! Ask your nurse or doctor about these medications  
  docusate sodium 100 mg capsule  
 enoxaparin 40 mg/0.4 mL  
 oxyCODONE-acetaminophen 5-325 mg per tablet

## 2018-06-28 NOTE — H&P (VIEW-ONLY)
New Prostate Cancer Discussion  Intermediate Risk - Favorable  Clinical staging T2b Kendy score (3+4) in 1 core and kendy score (3+3) in 2 cores. TRUS vol 35g. Presenting PSA 4.1 ng/ml. Dx 3/27/18    ASSESSMENT:   1. Prostate cancer    S/p TRUS Bx 3/27/2018 - gl score 3+4 and 3+3   ROBERTO CARLOS today - Prostate is 30 grams, nodule at left apex       PLAN:    1. Reviewed pathology results with patient and discussed treatment options for prostate cancer, specifically active surveillance vs robotic radical prostatectomy vs XRT. Risks/benefits of procedures reviewed. Patient understands and all questions answered. 2. Patient interested in proceeding with surgery. Advised will need pcp clearance prior to procedure. Patient recently saw pcp.     3. Will send preop urine for culture     4. Will plan for Robotic assisted radical prostatectomy     DISCUSSION:   Using NCCN risk stratification criteria, his disease is considered intermediate risk. We discussed his diagnosis in detail, reviewing the significance of kendy score, PSA, ROBERTO CARLOS, and percentage of cores positive. We discussed the various management options for prostate cancer, including active surveillance, open and robotic radical prostatectomy, EBRT, brachytherapy, proton therapy, and cryotherapy. We discussed the generally indolent course of many prostate cancers, and the generally favorable oncologic control offered by all treatment strategies. However, I emphasized that all treatments offer only potential for cure and that in many cases multimodal therapy may ultimately be utilized for long term cancer control. We also discussed the impact of treatment on sexual and urinary function. I emphasized that each treatment has unique quality of life impact and recovery profiles, and we reviewed the possible effects of surgery, radiation, and cryotherapy on quality of life outcomes. All questions were answered.       We discussed the risks and benefits of surgery including, but not limited to, infection, bleeding, blood transfusion, possible conversion to open surgery, possible injury to surrounding organs requiring immediate or delayed repair, positioning related nerve injuries, and global anesthesia risks including but not limited to CVA, MI, DVT, PE, pneumonia, and death. The patient expressed understanding and desire to proceed. 34 minutes was spent reviewing the current situation with the patient in a face-to-face discussion. Greater than 50% of the time was spent in consultation. Chief Complaint   Patient presents with    Prostate Cancer     HISTORY OF PRESENT ILLNESS:  Keenan Kwong is a 60 y.o. male who is seen in follow up for his newly diagnosed prostate cancer. He is referred by Dr. So Neff to discuss treatment with prostatectomy. He is s/p TRUS bx pathology revealed T2b Kendy score (3+4) in 1 core and kendy score (3+3) in 2 cores. TRUS vol 35g. Presenting PSA 4.1 ng/ml. Dx 3/27/18. Patient is doing well today  Presents with wife to discuss treatment for his cancer     Denies issues with erections. Not using PDE5's. Denies gross hematuria, dysuria. Asymptomatic for urinary infection. Denies flank pain. No f/n/c/v    No hx of MI, CVA, lung problem, SOB  No hx of abdominal surgery     FMHx of uterine cancer in mother     Social:           PATHOLOGY REPORT 3/27/2018   FINAL DIAGNOSIS:   A. PROSTATE, LEFT APEX, CORE NEEDLE BIOPSY:   - PROSTATIC ADENOCARCINOMA, KENDY SCORE 6 (3+3), GRADE GROUP 1,   INVOLVING 1 OF 2 CORES AND APPROXIMATELY 22% OF SUBMITTED TISSUE.   - SUSPICIOUS FOR PERINEURAL INVASION. B. PROSTATE, LEFT MID, CORE NEEDLE BIOPSY:   - PROSTATIC ADENOCARCINOMA, KENDY SCORE 7 (3+4), GRADE GROUP 2,   INVOLVING 2 OF 2 CORES AND APPROXIMATELY 37% OF SUBMITTED TISSUE.    C. PROSTATE, LEFT BASE, CORE NEEDLE BIOPSY:   - PROSTATIC ADENOCARCINOMA, KENDY SCORE 6 (3+3), GRADE GROUP 1,   INVOLVING 2 OF 4 CORES AND APPROXIMATELY 32% OF SUBMITTED TISSUE. D. PROSTATE, RIGHT APEX, CORE NEEDLE BIOPSY:   - BENIGN PROSTATIC GLANDS AND STROMA, NO TUMOR SEEN. E. PROSTATE, RIGHT MID, CORE NEEDLE BIOPSY:   - BENIGN PROSTATIC GLANDS AND STROMA, NO TUMOR SEEN. F. PROSTATE, RIGHT BASE, CORE NEEDLE BIOPSY:   - BENIGN PROSTATIC GLANDS AND STROMA, NO TUMOR SEEN. Past Medical History:   Diagnosis Date    Sleep apnea     Patient states resolved since T&A surgery       Review of Systems  Constitutional: Fever: No  Skin: Rash: No  HEENT: Hearing difficulty: No  Eyes: Blurred vision: No  Cardiovascular: Chest pain: No  Respiratory: Shortness of breath: No  Gastrointestinal: Nausea/vomiting: No  Musculoskeletal: Back pain: No  Neurological: Weakness: No  Psychological: Memory loss: No  Comments/additional findings:       PHYSICAL EXAMINATION:   Visit Vitals    /62    Ht 5' 10\" (1.778 m)    Wt 213 lb (96.6 kg)    BMI 30.56 kg/m2     Constitutional: WDWN, Pleasant and appropriate affect, No acute distress. CV:  No peripheral swelling noted  Respiratory: No respiratory distress or difficulties  Abdomen:  No abdominal masses or tenderness. No CVA tenderness. No inguinal hernias noted.  Male:    ROBERTO CARLOS:Perineum normal to visual inspection, no erythema or irritation, Sphincter with good tone, Rectum with no hemorrhoids, fissures or masses, Prostate smooth, symmetric and anodular. Prostate is 30 grams, nodule at left apex . SCROTUM:  No scrotal rash or lesions noticed. Normal bilateral testes and epididymis. PENIS: Urethral meatus normal in location and size. No urethral discharge. Skin: No jaundice. Neuro/Psych:  Alert and oriented x 3, affect appropriate. Lymphatic:   No enlarged inguinal lymph nodes.         Social History     Social History    Marital status:      Spouse name: N/A    Number of children: N/A    Years of education: N/A     Social History Main Topics    Smoking status: Former Smoker Quit date: 1/1/1981    Smokeless tobacco: Never Used    Alcohol use Yes      Comment: occassionally    Drug use: No    Sexual activity: Yes     Other Topics Concern    None     Social History Narrative       Family History   Problem Relation Age of Onset    Cancer Mother     Breast Cancer Mother     Diabetes Brother     Diabetes Maternal Grandfather     Diabetes Paternal Grandfather        Outpatient Encounter Prescriptions as of 6/25/2018   Medication Sig Dispense Refill    lisinopril (PRINIVIL, ZESTRIL) 2.5 mg tablet 2.5 mg daily. Patient states he takes for kidney protection      metFORMIN (GLUCOPHAGE) 500 mg tablet two (2) times daily (with meals). Patient states he takes 1/2 tab BID       No facility-administered encounter medications on file as of 6/25/2018. No Known Allergies    A copy of today's office visit with all pertinent imaging results and labs were sent to the referring physician. Gabrielle Morrison M.D. Urology of [address] [place] [address] One, [address] [place] [address],[address] [place] [address]  P: [phone]   F: [phone]      This chart was documented with the assistance of Joie Moreno medical scribe for Gabrielle Morrison MD on 6/25/2018.

## 2018-06-28 NOTE — IP AVS SNAPSHOT
[address] Patient: Darrell Leno MRN: [id number] About your hospitalization You were admitted on:  June 28, 2018 You last received care in the:  [facility] You were discharged on:  June 29, 2018 Why you were hospitalized Your primary diagnosis was:  Not on File Your diagnoses also included:  Prostate Cancer (Hcc) Follow-up Information Follow up With Details Comments Contact Info SANJEEV Montgomery Go on 7/5/2018 Appointment @ 1:30pm [address] 
[phone] Hector Roberts MD Go on 7/5/2018 Appointment @ 8:50AM [address] [place] [address] 200 200 [facility] 
[phone] Your Scheduled Appointments Thursday July 05, 2018  8:50 AM EDT Nurse Visit with Daron Torres [facility] of [facility] ([address] [place] [address]) [address] [place] [address] 200 200 [facility]  
[phone] Thursday August 02, 2018  1:20 PM EDT  
ESTABLISHED PATIENT with Emerita Cheng NP [facility]rology of [facility] ([address] [place] [address]) [address] [place] [address] 200 200 [facility]  
[phone] Wednesday August 08, 2018 10:00 AM EDT PROCEDURE with Robin Romero PT [facility]rology of [facility] ([address] [place] [address]) [address] [place] [address] 200 200 [facility]  
[phone] Discharge Orders None A check ksenia indicates which time of day the medication should be taken. My Medications START taking these medications Instructions Each Dose to Equal  
 Morning Noon Evening Bedtime  
 docusate sodium 100 mg capsule Commonly known as:  Collin Lai Your next dose is:  6/29 pm   
   
 Take 1 Cap by mouth two (2) times a day for 20 days.   
 100 mg  
    
  
   
   
  
   
  
 enoxaparin 40 mg/0.4 mL  
 Commonly known as:  LOVENOX Your next dose is:  6/30 am   
   
 0.4 mL by SubCUTAneous route daily. 40 mg  
    
  
   
   
   
  
 oxyCODONE-acetaminophen 5-325 mg per tablet Commonly known as:  PERCOCET Your next dose is:  3:50 pm if needed and every 4 hours thereafter if needed Take 1-2 Tabs by mouth every four (4) hours as needed for Pain. Max Daily Amount: 12 Tabs. 1-2 Tab CONTINUE taking these medications Instructions Each Dose to Equal  
 Morning Noon Evening Bedtime  
 lisinopril 2.5 mg tablet Commonly known as:  Radha Shames Your next dose is:  6/30 am   
   
 2.5 mg daily. Patient states he takes for kidney protection 2.5 mg  
    
  
   
   
   
  
 metFORMIN 500 mg tablet Commonly known as:  GLUCOPHAGE Your next dose is:  6/29 pm   
   
 two (2) times daily (with meals). Patient states he takes 1/2 tab BID Where to Get Your Medications Information on where to get these meds will be given to you by the nurse or doctor. ! Ask your nurse or doctor about these medications  
  docusate sodium 100 mg capsule  
 enoxaparin 40 mg/0.4 mL  
 oxyCODONE-acetaminophen 5-325 mg per tablet Opioid Education Prescription Opioids: What You Need to Know: 
 
Prescription opioids can be used to help relieve moderate-to-severe pain and are often prescribed following a surgery or injury, or for certain health conditions. These medications can be an important part of treatment but also come with serious risks. Opioids are strong pain medicines. Examples include hydrocodone, oxycodone, fentanyl, and morphine. Heroin is an example of an illegal opioid. It is important to work with your health care provider to make sure you are getting the safest, most effective care. WHAT ARE THE RISKS AND SIDE EFFECTS OF OPIOID USE?  
Prescription opioids carry serious risks of addiction and overdose, especially with prolonged use. An opioid overdose, often marked by slow breathing, can cause sudden death. The use of prescription opioids can have a number of side effects as well, even when taken as directed. · Tolerance-meaning you might need to take more of a medication for the same pain relief · Physical dependence-meaning you have symptoms of withdrawal when the medication is stopped. Withdrawal symptoms can include nausea, sweating, chills, diarrhea, stomach cramps, and muscle aches. Withdrawal can last up to several weeks, depending on which drug you took and how long you took it. · Increased sensitivity to pain · Constipation · Nausea, vomiting, and dry mouth · Sleepiness and dizziness · Confusion · Depression · Low levels of testosterone that can result in lower sex drive, energy, and strength · Itching and sweating RISKS ARE GREATER WITH:      
· History of drug misuse, substance use disorder, or overdose · Mental health conditions (such as depression or anxiety) · Sleep apnea · Older age (72 years or older) · Pregnancy Avoid alcohol while taking prescription opioids. Also, unless specifically advised by your health care provider, medications to avoid include: · Benzodiazepines (such as Xanax or Valium) · Muscle relaxants (such as Soma or Flexeril) · Hypnotics (such as Ambien or Lunesta) · Other prescription opioids KNOW YOUR OPTIONS Talk to your health care provider about ways to manage your pain that don't involve prescription opioids. Some of these options may actually work better and have fewer risks and side effects. Options may include: 
· Pain relievers such as acetaminophen, ibuprofen, and naproxen · Some medications that are also used for depression or seizures · Physical therapy and exercise · Counseling to help patients learn how to cope better with triggers of pain and stress. · Application of heat or cold compress · Massage therapy · Relaxation techniques Be Informed Make sure you know the name of your medication, how much and how often to take it, and its potential risks & side effects. IF YOU ARE PRESCRIBED OPIOIDS FOR PAIN: 
· Never take opioids in greater amounts or more often than prescribed. Remember the goal is not to be pain-free but to manage your pain at a tolerable level. · Follow up with your primary care provider to: · Work together to create a plan on how to manage your pain. · Talk about ways to help manage your pain that don't involve prescription opioids. · Talk about any and all concerns and side effects. · Help prevent misuse and abuse. · Never sell or share prescription opioids · Help prevent misuse and abuse. · Store prescription opioids in a secure place and out of reach of others (this may include visitors, children, friends, and family). · Safely dispose of unused/unwanted prescription opioids: Find your community drug take-back program or your pharmacy mail-back program, or flush them down the toilet, following guidance from the Food and Drug Administration (www.fda.gov/Drugs/ResourcesForYou). · Visit www.cdc.gov/drugoverdose to learn about the risks of opioid abuse and overdose. · If you believe you may be struggling with addiction, tell your health care provider and ask for guidance or call [facility] at 2-143-690-CUMR. Discharge Instructions Discharge Instructions Patient: Etta Montes               Sex: male          DOA: 6/28/2018 YOB: 1955      Age:  60 y.o.        LOS:  LOS: 0 days ACUTE DIAGNOSES: 
Prostate cancer ([facility] [address] Dasie Minus CHRONIC MEDICAL DIAGNOSES: 
Problem List as of 6/28/2018  Date Reviewed: 6/26/2018 Codes Class Noted - Resolved Prostate cancer [facility]) ICD-10-CM: L68 ICD-9-CM: 185  6/28/2018 - Present OPERATION: Robotic prostatectomy DISCHARGE MEDICATIONS:  
· It is important that you take the medication exactly as they are prescribed. · Keep your medication in the bottles provided by the pharmacist and keep a list of the medication names, dosages, and times to be taken in your wallet. · Do not take other medications without consulting your doctor. CATHETER: You may be discharged with a medrano catheter in your bladder. The nurses will teach you have to take care of it. You may have blood in your urine which is normal. Please drink plenty of fluids to remain hydrated. If you are passing clots or your urine becomes bright red please call the office. DIET:  You may resume a regular diet. ACTIVITY: No strenuous activity or heavy lifting (>10 lbs) for 4 weeks. Walking is strongly encouraged. You may shower. No baths or pools for 4 weeks. It is normal to not have a bowel movement as long as you continue to pass flatus. Take stool softeners to prevent constipation. ADDITIONAL INFORMATION: If you experience any of the following symptoms then please call your primary care physician or return to the emergency room if you cannot get hold of your doctor: Fever, chills, nausea, vomiting, diarrhea, change in mentation, falling, bleeding, shortness of breath. FOLLOW UP CARE: 
You will follow up with Dr. Al Akers for medrano removal in 1 week. Dr. Al Akers will see you in 5 weeks. Office: [phone] Information obtained by : 
I understand that if any problems occur once I am at home I am to contact my physician. I understand and acknowledge receipt of the instructions indicated above. Physician's or R.N.'s Signature                                                                  Date/Time Patient or Representative Signature                                                          Date/Time [employer] Announcement We are excited to announce that we are making your provider's discharge notes available to you in [employer]. You will see these notes when they are completed and signed by the physician that discharged you from your recent hospital stay. If you have any questions or concerns about any information you see in [employer], please call the Health Information Department where you were seen or reach out to your Primary Care Provider for more information about your plan of care. Introducing [facility] SERVICES! Leroy Schafer introduces [employer] patient portal. Now you can access parts of your medical record, email your doctor's office, and request medication refills online. 1. In your internet browser, go to [email or web address] 2. Click on the First Time User? Click Here link in the Sign In box. You will see the New Member Sign Up page. 3. Enter your [employer] Access Code exactly as it appears below. You will not need to use this code after youve completed the sign-up process. If you do not sign up before the expiration date, you must request a new code. · [employer] Access Code: [email or web address] Expires: 9/23/2018  4:46 PM 
 
4. Enter the last four digits of your Social Security Number (xxxx) and Date of Birth (mm/dd/yyyy) as indicated and click Submit. You will be taken to the next sign-up page. 5. Create a [employer] ID. This will be your [employer] login ID and cannot be changed, so think of one that is secure and easy to remember. 6. Create a [employer] password. You can change your password at any time. 7. Enter your Password Reset Question and Answer. This can be used at a later time if you forget your password. 8. Enter your e-mail address. You will receive e-mail notification when new information is available in [address] 9. Click Sign Up. You can now view and download portions of your medical record. 10. Click the Download Summary menu link to download a portable copy of your medical information. If you have questions, please visit the Frequently Asked Questions section of the [employer] website. Remember, [facility] is NOT to be used for urgent needs. For medical emergencies, dial 911. Now available from your iPhone and Android! Introducing [employer]ter As a [facility] patient, I wanted to make you aware of our electronic visit tool called [employer]. [facility] allows you to connect within minutes with a medical provider 24 hours a day, seven days a week via a mobile device or tablet or logging into a secure website from your computer. You can access Che[employer]ter from anywhere in the [place]. A virtual visit might be right for you when you have a simple condition and feel like you just dont want to get out of bed, or cant get away from work for an appointment, when your regular [facility] provider is not available (evenings, weekends or holidays), or when youre out of town and need minor care. Electronic visits cost only $49 and if the [facility] provider determines a prescription is needed to treat your condition, one can be electronically transmitted to a nearby pharmacy*. Please take a moment to enroll today if you have not already done so. The enrollment process is free and takes just a few minutes. To enroll, please download the [facility] danae to your tablet or phone, or visit [email or web address] to enroll on your computer.    
And, as an [address] [place] [address] patient with a [employer] account, the results of your visits will be scanned into your electronic medical record and your primary care provider will be able to view the scanned results. We urge you to continue to see your regular [employer] provider for your ongoing medical care. And while your primary care provider may not be the one available when you seek a [facility]vy Gonzaleskaitl[facility] virtual visit, the peace of mind you get from getting a real diagnosis real time can be priceless. For more information on [employer], view our Frequently Asked Questions (FAQs) at [email or web address]. Sincerely, 
 
Swati Cyr MD 
Chief Medical Officer [employer] *:  certain medications cannot be prescribed via [employer] [facility] Providers Seen During Your Hospitalization Provider Specialty Primary office phone José Miguel Veliz MD Urology [phone] Your Primary Care Physician (PCP) Primary Care Physician Office Phone Office Fax Frandy Nicole [phone] You are allergic to the following No active allergies Recent Documentation Height Weight BMI Smoking Status 1.778 m 94.8 kg 29.99 kg/m2 Former Smoker Emergency Contacts Name Discharge Info Relation Home Work Mobile Tata Rushing DISCHARGE CAREGIVER [3] Spouse [3] [phone] Patient Belongings The following personal items are in your possession at time of discharge: 
  Dental Appliances: None  Visual Aid: None      Home Medications: None   Jewelry: None  Clothing: At bedside, Footwear, Pants, Shirt, Undergarments    Other Valuables: None Please provide this summary of care documentation to your next provider. Signatures-by signing, you are acknowledging that this After Visit Summary has been reviewed with you and you have received a copy. Patient Signature:  ____________________________________________________________ Date:  ____________________________________________________________  
  
Scott Rosenberg  Provider Signature: ____________________________________________________________ Date:  ____________________________________________________________

## 2018-06-28 NOTE — INTERVAL H&P NOTE
H&P Update:  Ramana Singh was seen and examined. History and physical has been reviewed. The patient has been examined.  There have been no significant clinical changes since the completion of the originally dated History and Physical.    Signed By: Hellen Guzman MD     June 28, 2018 12:20 PM

## 2018-06-28 NOTE — OP NOTES
Operative Report    Patient Name:  Berry Aj      PREOPERATIVE DIAGNOSIS:  Prostate cancer. POSTOPERATIVE DIAGNOSIS:  Prostate cancer. OPERATION PERFORMED:  1. Robotic-assisted laparoscopic radical prostatectomy, bilateral nerve sparing. 2. Robotic-assisted laparoscopic bilateral pelvic lymph node dissection. SURGEON:  Haylee Rogel M.D.      ASSISTANT:  Celestino Cornejo SA      ANESTHESIA:  GETA      ESTIMATED BLOOD LOSS:  803 ml      COMPLICATIONS:  None. INDICATIONS FOR OPERATION:  This is a 60 y.o.  male with a history of clinical T2b prostate cancer who presents for a robotic assisted radical prostatectomy. FINDINGS:  1. Approximately 40 gram prostate with no obvious extracapsular extension. 2. Bilateral nerve-sparing procedure was performed. 3. Grossly normal bilateral pelvic lymph node packets. SPECIMENS:     ID Type Source Tests Collected by Time Destination   1 : PROSTATE Preservative Prostate  Haylee Rogel MD 6/28/2018  3:41 PM Pathology   2 : lymph nodes #1 Preservative Lymph Node  Haylee Rogel MD 6/28/2018  3:41 PM Pathology   3 : lymph nodes #2 Preservative Lymph Node  Haylee Rogel MD 6/28/2018  3:42 PM Pathology   4 : bladder neck margin Preservative Bladder  Haylee Rogel MD 6/28/2018  3:48 PM Pathology       DRAINS:  25 Fr Carvajal catheter. DESCRIPTION OF OPERATION:  The patient was identified and informed consent was verified. The patient was taken to the operating room and placed supine on the operating table. SCDs were confirmed to be on and functioning. Preoperative antibiotics were given. After induction of general anesthesia, the patient was placed in a low lithotomy position with his arms carefully padded and tucked at his sides. All pressure points were carefully and meticulously padded. The patient was then prepped and draped in the standard sterile fashion. A Carvajal catheter was placed to drain the bladder.     A supraumbilical incision was made. A Veress needle was placed to obtain pneumoperitoneum. An 8 mm  trocar was passed at this location, the camera was inserted. There were no injuries or abnormalities. Under direct vision, the additional ports were placed to create our standard 6 port arrangement with 2 robotic trocars in the left lower quadrant, 1 in the right lower quadrant with an additional 12 mm trocar laterally. In the right upper quadrant, a 5 mm trocar was placed. The robot was docked. The medial umbilical ligaments and urachus were sharply divided. The bladder was taken down and the space of Retzius developed. The peritoneal wings along the medial ligaments were taken down to the vas deferens bilaterally. Anterior prostatic fat was cleared off. The endopelvic fascia was exposed and incised. The levator muscles were pushed away laterally, and the apex of the prostate was exposed. The dorsal venous complex was ligated with an 0-0 vicryl suture. The anterior bladder neck was opened. The Carvajal catheter was lifted out. The posterior urethra was divided. Dissection was carried down further until the vas were  identified, and each vas was dissected out with monopolar cautery and then divided. The seminal vesicles were then dissected out. The vascular supply to the seminal vesicles was clipped and divided sharply with scissors. Denonvilliers fascia was incised and the posterior plane was developed. The right vascular pedicle was then exposed and clipped with Weck clips and subsequently divided sharply. The right neurovascular bundle was further mobilized from the posterolateral base of the prostate to the apex. The nerve sparing dissection and pedicle division was repeated on the left side without complication. The apical dissection was then carried out. The urethra was exposed and divided sharply, and the prostate and seminal vesicles were then lifted out.  Hemostasis was ensured in the prostatic fossa. There was bleeding from the DVC. This was oversewn using an 0-0 V-lock suture. Next, a bilateral pelvic lymph node dissection was performed. The limits of dissection were the obturator nerve posteriorly, the pelvic side wall laterally, the external iliac vein anteriorly, and the node of Onida distally. The procedure was repeated on the left side without complication. Posterior reconstruction was achieved using a 3-0 V-lock suture to create an approximation of the cut edge of Denonvillier's fascia. 2 3-0 V-lock sutures were used for the urethrovesical anastomosis in a running fashion. Starting on the bladder neck side at the 5 o'clock position, the posterior suture line was cinched down securely in a watertight fashion. The anastomosis was then performed in a running fashion. A new 18 Fr Carvajal catheter was placed. The bladder was filled with about 250 mL of sterile saline, and there was no leak. The trocars were removed and the robot undocked. The 12 mm incision was extended and the specimen removed. The fascia was closed with running 0-0 vicryl suture. All of the wounds were then irrigated and then infiltrated with 0.25% Marcaine. The skin edges were reapproximated with 4-0 Monocryl in a subcuticular fashion. The patient was awoken and transferred to recovery in good condition.      Aura Ferrera MD

## 2018-06-28 NOTE — PERIOP NOTES
Assumed care of patient after report from ALBAN Medel RN. Patient denies pain, nausea. 1740 Verbal update given to wife via phone.

## 2018-06-29 VITALS
HEART RATE: 62 BPM | HEIGHT: 70 IN | WEIGHT: 209 LBS | TEMPERATURE: 97.9 F | SYSTOLIC BLOOD PRESSURE: 109 MMHG | DIASTOLIC BLOOD PRESSURE: 67 MMHG | BODY MASS INDEX: 29.92 KG/M2 | OXYGEN SATURATION: 100 % | RESPIRATION RATE: 16 BRPM

## 2018-06-29 LAB
ANION GAP SERPL CALC-SCNC: 7 MMOL/L (ref 3–18)
BUN SERPL-MCNC: 12 MG/DL (ref 7–18)
BUN/CREAT SERPL: 13 (ref 12–20)
CALCIUM SERPL-MCNC: 8.4 MG/DL (ref 8.5–10.1)
CHLORIDE SERPL-SCNC: 105 MMOL/L (ref 100–108)
CO2 SERPL-SCNC: 25 MMOL/L (ref 21–32)
CREAT SERPL-MCNC: 0.92 MG/DL (ref 0.6–1.3)
ERYTHROCYTE [DISTWIDTH] IN BLOOD BY AUTOMATED COUNT: 12.6 % (ref 11.6–14.5)
GLUCOSE BLD STRIP.AUTO-MCNC: 112 MG/DL (ref 70–110)
GLUCOSE BLD STRIP.AUTO-MCNC: 147 MG/DL (ref 70–110)
GLUCOSE SERPL-MCNC: 157 MG/DL (ref 74–99)
HCT VFR BLD AUTO: 39.8 % (ref 36–48)
HGB BLD-MCNC: 13.5 G/DL (ref 13–16)
MCH RBC QN AUTO: 28.7 PG (ref 24–34)
MCHC RBC AUTO-ENTMCNC: 33.9 G/DL (ref 31–37)
MCV RBC AUTO: 84.5 FL (ref 74–97)
PLATELET # BLD AUTO: 156 K/UL (ref 135–420)
PMV BLD AUTO: 11.2 FL (ref 9.2–11.8)
POTASSIUM SERPL-SCNC: 4.2 MMOL/L (ref 3.5–5.5)
RBC # BLD AUTO: 4.71 M/UL (ref 4.7–5.5)
SODIUM SERPL-SCNC: 137 MMOL/L (ref 136–145)
WBC # BLD AUTO: 16 K/UL (ref 4.6–13.2)

## 2018-06-29 PROCEDURE — 82962 GLUCOSE BLOOD TEST: CPT

## 2018-06-29 PROCEDURE — 85027 COMPLETE CBC AUTOMATED: CPT | Performed by: UROLOGY

## 2018-06-29 PROCEDURE — 80048 BASIC METABOLIC PNL TOTAL CA: CPT | Performed by: UROLOGY

## 2018-06-29 PROCEDURE — 74011250637 HC RX REV CODE- 250/637: Performed by: UROLOGY

## 2018-06-29 PROCEDURE — 36415 COLL VENOUS BLD VENIPUNCTURE: CPT | Performed by: UROLOGY

## 2018-06-29 PROCEDURE — 74011250636 HC RX REV CODE- 250/636: Performed by: UROLOGY

## 2018-06-29 PROCEDURE — 99218 HC RM OBSERVATION: CPT

## 2018-06-29 RX ADMIN — DOCUSATE SODIUM 100 MG: 100 CAPSULE, LIQUID FILLED ORAL at 08:28

## 2018-06-29 RX ADMIN — SODIUM CHLORIDE 125 ML/HR: 900 INJECTION, SOLUTION INTRAVENOUS at 03:34

## 2018-06-29 RX ADMIN — Medication 10 ML: at 06:08

## 2018-06-29 RX ADMIN — OXYCODONE HYDROCHLORIDE AND ACETAMINOPHEN 1 TABLET: 5; 325 TABLET ORAL at 11:49

## 2018-06-29 RX ADMIN — HEPARIN SODIUM 5000 UNITS: 5000 INJECTION, SOLUTION INTRAVENOUS; SUBCUTANEOUS at 03:34

## 2018-06-29 RX ADMIN — OXYCODONE HYDROCHLORIDE AND ACETAMINOPHEN 1 TABLET: 5; 325 TABLET ORAL at 06:12

## 2018-06-29 RX ADMIN — HEPARIN SODIUM 5000 UNITS: 5000 INJECTION, SOLUTION INTRAVENOUS; SUBCUTANEOUS at 11:45

## 2018-06-29 RX ADMIN — LISINOPRIL 2.5 MG: 5 TABLET ORAL at 08:28

## 2018-06-29 RX ADMIN — Medication 10 ML: at 14:00

## 2018-06-29 NOTE — ROUTINE PROCESS
Bedside and Verbal shift change report given to Lynn Prescott (oncoming nurse) by Marilyn Holden (offgoing nurse). Report included the following information SBAR, Kardex and MAR.

## 2018-06-29 NOTE — PROGRESS NOTES
Care Management Specialist reviewed over the Observation Letter with patient. Patient signed the letter. Patient given a copy of letter by bedside. Signed copy placed into patient chart.

## 2018-06-29 NOTE — DISCHARGE INSTRUCTIONS
Discharge Instructions      Patient: Bertha Rios               Sex: male          DOA: 6/28/2018         YOB: 1955      Age:  60 y.o.        LOS:  LOS: 0 days     ACUTE DIAGNOSES:  Prostate cancer ([facility] [address] [place]    CHRONIC MEDICAL DIAGNOSES:  Problem List as of 6/28/2018  Date Reviewed: 6/26/2018          Codes Class Noted - Resolved    Prostate cancer [facility]) ICD-10-CM: C61  ICD-9-CM: 185  6/28/2018 - Present              OPERATION: Robotic prostatectomy    DISCHARGE MEDICATIONS:   · It is important that you take the medication exactly as they are prescribed. · Keep your medication in the bottles provided by the pharmacist and keep a list of the medication names, dosages, and times to be taken in your wallet. · Do not take other medications without consulting your doctor. CATHETER: You may be discharged with a medrano catheter in your bladder. The nurses will teach you have to take care of it. You may have blood in your urine which is normal. Please drink plenty of fluids to remain hydrated. If you are passing clots or your urine becomes bright red please call the office. DIET:  You may resume a regular diet. ACTIVITY: No strenuous activity or heavy lifting (>10 lbs) for 4 weeks. Walking is strongly encouraged. You may shower. No baths or pools for 4 weeks. It is normal to not have a bowel movement as long as you continue to pass flatus. Take stool softeners to prevent constipation. ADDITIONAL INFORMATION: If you experience any of the following symptoms then please call your primary care physician or return to the emergency room if you cannot get hold of your doctor: Fever, chills, nausea, vomiting, diarrhea, change in mentation, falling, bleeding, shortness of breath. FOLLOW UP CARE:  You will follow up with Dr. Kisha Vazquez for medrano removal in 1 week. Dr. Kisha Vazquez will see you in 5 weeks.      Office: [phone]        Information obtained by :  I understand that if any problems occur once I am at home I am to contact my physician. I understand and acknowledge receipt of the instructions indicated above.                                                                                                                                            Physician's or R.N.'s Signature                                                                  Date/Time                                                                                                                                              Patient or Representative Signature                                                          Date/Time

## 2018-06-29 NOTE — ROUTINE PROCESS
Bedside and Verbal shift change report given to Kristel Bright (oncoming nurse) by Ruben Lowe (offgoing nurse). Report included the following information SBAR, Kardex, Intake/Output, MAR and Recent Results.

## 2018-06-29 NOTE — PROGRESS NOTES
Problem: Falls - Risk of  Goal: *Absence of Falls  Document Andriy Fall Risk and appropriate interventions in the flowsheet.    Outcome: Progressing Towards Goal  Fall Risk Interventions:            Medication Interventions: Teach patient to arise slowly    Elimination Interventions: Call light in reach

## 2018-06-29 NOTE — DISCHARGE SUMMARY
Discharge Summary    Patient: Italia Manning               Sex: male          DOA: 6/28/2018         YOB: 1955      Age:  60 y.o.        LOS:  LOS: 0 days                Admit Date: 6/28/2018    Discharge Date: 6/29/2018    Admission Diagnoses: Prostate cancer [facility]) Jewels Silveira    Discharge Diagnoses:    Problem List as of 6/29/2018  Date Reviewed: 6/26/2018          Codes Class Noted - Resolved    Prostate cancer [facility]) ICD-10-CM: Jessica Ryan  ICD-9-CM: 185  6/28/2018 - Present              Discharge Condition: Good    Hospital Course: The patient underwent robotic prostatectomy and did well postoperatively. Their diet was advanced as tolerated. Activity was gradually increased. Pain was well controlled. The patient was discharged home on POD 1 in stable condition. Consults: None    Significant Diagnostic Studies: none    Discharge Medications:     Current Discharge Medication List      START taking these medications    Details   oxyCODONE-acetaminophen (PERCOCET) 5-325 mg per tablet Take 1-2 Tabs by mouth every four (4) hours as needed for Pain. Max Daily Amount: 12 Tabs. Qty: 31 Tab, Refills: 0      docusate sodium (COLACE) 100 mg capsule Take 1 Cap by mouth two (2) times a day for 20 days. Qty: 40 Cap, Refills: 0      enoxaparin (LOVENOX) 40 mg/0.4 mL 0.4 mL by SubCUTAneous route daily. Qty: 28 Syringe, Refills: 0         CONTINUE these medications which have NOT CHANGED    Details   lisinopril (PRINIVIL, ZESTRIL) 2.5 mg tablet 2.5 mg daily. Patient states he takes for kidney protection      metFORMIN (GLUCOPHAGE) 500 mg tablet two (2) times daily (with meals). Patient states he takes 1/2 tab BID             Activity: No heavy lifting or strenuous activity for 4 weeks. Diet: Regular    Wound Care: Keep wound clean and dry. Disposition: Home with self care    Follow-up: The patient will be seen in the clinic in 4 weeks.

## 2018-07-02 NOTE — ANESTHESIA POSTPROCEDURE EVALUATION
Post-Anesthesia Evaluation and Assessment    Patient: Waqas Tapia MRN: [id number]  SSN: [id number]    YOB: 1955  Age: 60 y.o. Sex: male       Cardiovascular Function/Vital Signs  Visit Vitals    /67 (BP 1 Location: Right arm, BP Patient Position: Sitting)    Pulse 62    Temp 36.6 °C (97.9 °F)    Resp 16    Ht 5' 10\" (1.778 m)    Wt 94.8 kg (209 lb)    SpO2 100%    BMI 29.99 kg/m2       Patient is status post general anesthesia for Procedure(s):  ROBOTIC ASSISTED RADICAL PROSTATECTOMY/BILATERAL PELVIC LYMPHN NODE DISSECTION/C-ARM. Nausea/Vomiting: None    Postoperative hydration reviewed and adequate. Pain:  Pain Scale 1: Numeric (0 - 10) (06/29/18 1200)  Pain Intensity 1: 0 (06/29/18 1200)   Managed    Neurological Status:   Neuro (WDL): Exceptions to WDL (06/28/18 1658)  Neuro  Neurologic State: Alert; Appropriate for age (06/29/18 9872)   At baseline    Mental Status and Level of Consciousness: Arousable    Pulmonary Status:   O2 Device: Room air (06/29/18 0804)   Adequate oxygenation and airway patent    Complications related to anesthesia: None    Post-anesthesia assessment completed.  No concerns    Signed By: Desmond Scruggs MD     July 2, 2018

## 2018-07-05 NOTE — PROGRESS NOTES
Reviewed path. Gl grade group 3. 10% of gland. T2cN0 -JUAN, -SVI, -SM. 0/6LNs  Pt expressed understanding.     Johnna Barthel, MD

## 2019-04-10 NOTE — ANESTHESIA PREPROCEDURE EVALUATION
Anesthetic History   No history of anesthetic complications            Review of Systems / Medical History  Patient summary reviewed and pertinent labs reviewed    Pulmonary        Sleep apnea: CPAP           Neuro/Psych   Within defined limits           Cardiovascular  Within defined limits                     GI/Hepatic/Renal  Within defined limits              Endo/Other    Diabetes: well controlled, type 2    Obesity     Other Findings   Comments: Documentation of current medication  Current medications obtained, documented and obtained? YES      Risk Factors for Postoperative nausea/vomiting:       History of postoperative nausea/vomiting? NO       Female? NO       Motion sickness? NO       Intended opioid administration for postoperative analgesia? YES      Smoking Abstinence:  Current Smoker? NO  Elective Surgery? YES  Seen preoperatively by anesthesiologist or proxy prior to day of surgery? YES  Pt abstained from smoking 24 hours prior to anesthesia?  N/A    Preventive care/screening for High Blood Pressure:  Aged 18 years and older: YES  Screened for high blood pressure: YES  Patients with high blood pressure referred to primary care provider   for BP management: YES                     Physical Exam    Airway  Mallampati: III  TM Distance: 4 - 6 cm  Neck ROM: normal range of motion   Mouth opening: Diminished (comment)     Cardiovascular               Dental    Dentition: Caps/crowns     Pulmonary                 Abdominal  GI exam deferred       Other Findings            Anesthetic Plan    ASA: 3  Anesthesia type: general          Induction: Intravenous  Anesthetic plan and risks discussed with: Patient
stated

## 2019-08-20 PROBLEM — R32 URINARY INCONTINENCE: Status: ACTIVE | Noted: 2019-08-20
